# Patient Record
Sex: MALE | Race: WHITE | NOT HISPANIC OR LATINO | URBAN - METROPOLITAN AREA
[De-identification: names, ages, dates, MRNs, and addresses within clinical notes are randomized per-mention and may not be internally consistent; named-entity substitution may affect disease eponyms.]

---

## 2023-11-23 ENCOUNTER — INPATIENT (INPATIENT)
Facility: HOSPITAL | Age: 53
LOS: 8 days | Discharge: NURSING, REHAB OR LONG-TERM CARE | DRG: 896 | End: 2023-12-02
Attending: STUDENT IN AN ORGANIZED HEALTH CARE EDUCATION/TRAINING PROGRAM | Admitting: INTERNAL MEDICINE
Payer: COMMERCIAL

## 2023-11-23 VITALS
TEMPERATURE: 99 F | RESPIRATION RATE: 16 BRPM | SYSTOLIC BLOOD PRESSURE: 145 MMHG | OXYGEN SATURATION: 97 % | HEART RATE: 88 BPM | WEIGHT: 199.96 LBS | DIASTOLIC BLOOD PRESSURE: 79 MMHG

## 2023-11-23 DIAGNOSIS — R56.9 UNSPECIFIED CONVULSIONS: ICD-10-CM

## 2023-11-23 LAB
ALBUMIN SERPL ELPH-MCNC: 3.5 G/DL — SIGNIFICANT CHANGE UP (ref 3.3–5.2)
ALBUMIN SERPL ELPH-MCNC: 3.5 G/DL — SIGNIFICANT CHANGE UP (ref 3.3–5.2)
ALP SERPL-CCNC: 140 U/L — HIGH (ref 40–120)
ALP SERPL-CCNC: 140 U/L — HIGH (ref 40–120)
ALT FLD-CCNC: 113 U/L — HIGH
ALT FLD-CCNC: 113 U/L — HIGH
ANION GAP SERPL CALC-SCNC: 24 MMOL/L — HIGH (ref 5–17)
ANION GAP SERPL CALC-SCNC: 24 MMOL/L — HIGH (ref 5–17)
AST SERPL-CCNC: 344 U/L — HIGH
AST SERPL-CCNC: 344 U/L — HIGH
BASOPHILS # BLD AUTO: 0.02 K/UL — SIGNIFICANT CHANGE UP (ref 0–0.2)
BASOPHILS # BLD AUTO: 0.02 K/UL — SIGNIFICANT CHANGE UP (ref 0–0.2)
BASOPHILS NFR BLD AUTO: 0.4 % — SIGNIFICANT CHANGE UP (ref 0–2)
BASOPHILS NFR BLD AUTO: 0.4 % — SIGNIFICANT CHANGE UP (ref 0–2)
BILIRUB SERPL-MCNC: 2.1 MG/DL — HIGH (ref 0.4–2)
BILIRUB SERPL-MCNC: 2.1 MG/DL — HIGH (ref 0.4–2)
BUN SERPL-MCNC: 6.2 MG/DL — LOW (ref 8–20)
BUN SERPL-MCNC: 6.2 MG/DL — LOW (ref 8–20)
CALCIUM SERPL-MCNC: 8.3 MG/DL — LOW (ref 8.4–10.5)
CALCIUM SERPL-MCNC: 8.3 MG/DL — LOW (ref 8.4–10.5)
CHLORIDE SERPL-SCNC: 79 MMOL/L — LOW (ref 96–108)
CHLORIDE SERPL-SCNC: 79 MMOL/L — LOW (ref 96–108)
CO2 SERPL-SCNC: 21 MMOL/L — LOW (ref 22–29)
CO2 SERPL-SCNC: 21 MMOL/L — LOW (ref 22–29)
CREAT SERPL-MCNC: 1.08 MG/DL — SIGNIFICANT CHANGE UP (ref 0.5–1.3)
CREAT SERPL-MCNC: 1.08 MG/DL — SIGNIFICANT CHANGE UP (ref 0.5–1.3)
EGFR: 82 ML/MIN/1.73M2 — SIGNIFICANT CHANGE UP
EGFR: 82 ML/MIN/1.73M2 — SIGNIFICANT CHANGE UP
EOSINOPHIL # BLD AUTO: 0 K/UL — SIGNIFICANT CHANGE UP (ref 0–0.5)
EOSINOPHIL # BLD AUTO: 0 K/UL — SIGNIFICANT CHANGE UP (ref 0–0.5)
EOSINOPHIL NFR BLD AUTO: 0 % — SIGNIFICANT CHANGE UP (ref 0–6)
EOSINOPHIL NFR BLD AUTO: 0 % — SIGNIFICANT CHANGE UP (ref 0–6)
ETHANOL SERPL-MCNC: <10 MG/DL — SIGNIFICANT CHANGE UP (ref 0–9)
ETHANOL SERPL-MCNC: <10 MG/DL — SIGNIFICANT CHANGE UP (ref 0–9)
GLUCOSE SERPL-MCNC: 110 MG/DL — HIGH (ref 70–99)
GLUCOSE SERPL-MCNC: 110 MG/DL — HIGH (ref 70–99)
HCT VFR BLD CALC: 35.8 % — LOW (ref 39–50)
HCT VFR BLD CALC: 35.8 % — LOW (ref 39–50)
HGB BLD-MCNC: 13.4 G/DL — SIGNIFICANT CHANGE UP (ref 13–17)
HGB BLD-MCNC: 13.4 G/DL — SIGNIFICANT CHANGE UP (ref 13–17)
IMM GRANULOCYTES NFR BLD AUTO: 1.1 % — HIGH (ref 0–0.9)
IMM GRANULOCYTES NFR BLD AUTO: 1.1 % — HIGH (ref 0–0.9)
LYMPHOCYTES # BLD AUTO: 0.29 K/UL — LOW (ref 1–3.3)
LYMPHOCYTES # BLD AUTO: 0.29 K/UL — LOW (ref 1–3.3)
LYMPHOCYTES # BLD AUTO: 5.4 % — LOW (ref 13–44)
LYMPHOCYTES # BLD AUTO: 5.4 % — LOW (ref 13–44)
MAGNESIUM SERPL-MCNC: 2 MG/DL — SIGNIFICANT CHANGE UP (ref 1.6–2.6)
MAGNESIUM SERPL-MCNC: 2 MG/DL — SIGNIFICANT CHANGE UP (ref 1.6–2.6)
MCHC RBC-ENTMCNC: 37.4 GM/DL — HIGH (ref 32–36)
MCHC RBC-ENTMCNC: 37.4 GM/DL — HIGH (ref 32–36)
MCHC RBC-ENTMCNC: 38.5 PG — HIGH (ref 27–34)
MCHC RBC-ENTMCNC: 38.5 PG — HIGH (ref 27–34)
MCV RBC AUTO: 102.9 FL — HIGH (ref 80–100)
MCV RBC AUTO: 102.9 FL — HIGH (ref 80–100)
MONOCYTES # BLD AUTO: 0.48 K/UL — SIGNIFICANT CHANGE UP (ref 0–0.9)
MONOCYTES # BLD AUTO: 0.48 K/UL — SIGNIFICANT CHANGE UP (ref 0–0.9)
MONOCYTES NFR BLD AUTO: 8.9 % — SIGNIFICANT CHANGE UP (ref 2–14)
MONOCYTES NFR BLD AUTO: 8.9 % — SIGNIFICANT CHANGE UP (ref 2–14)
NEUTROPHILS # BLD AUTO: 4.52 K/UL — SIGNIFICANT CHANGE UP (ref 1.8–7.4)
NEUTROPHILS # BLD AUTO: 4.52 K/UL — SIGNIFICANT CHANGE UP (ref 1.8–7.4)
NEUTROPHILS NFR BLD AUTO: 84.2 % — HIGH (ref 43–77)
NEUTROPHILS NFR BLD AUTO: 84.2 % — HIGH (ref 43–77)
NT-PROBNP SERPL-SCNC: 263 PG/ML — SIGNIFICANT CHANGE UP (ref 0–300)
NT-PROBNP SERPL-SCNC: 263 PG/ML — SIGNIFICANT CHANGE UP (ref 0–300)
PHOSPHATE SERPL-MCNC: 2.6 MG/DL — SIGNIFICANT CHANGE UP (ref 2.4–4.7)
PHOSPHATE SERPL-MCNC: 2.6 MG/DL — SIGNIFICANT CHANGE UP (ref 2.4–4.7)
PLATELET # BLD AUTO: 56 K/UL — LOW (ref 150–400)
PLATELET # BLD AUTO: 56 K/UL — LOW (ref 150–400)
POTASSIUM SERPL-MCNC: 3.2 MMOL/L — LOW (ref 3.5–5.3)
POTASSIUM SERPL-MCNC: 3.2 MMOL/L — LOW (ref 3.5–5.3)
POTASSIUM SERPL-SCNC: 3.2 MMOL/L — LOW (ref 3.5–5.3)
POTASSIUM SERPL-SCNC: 3.2 MMOL/L — LOW (ref 3.5–5.3)
PROT SERPL-MCNC: 6 G/DL — LOW (ref 6.6–8.7)
PROT SERPL-MCNC: 6 G/DL — LOW (ref 6.6–8.7)
RBC # BLD: 3.48 M/UL — LOW (ref 4.2–5.8)
RBC # BLD: 3.48 M/UL — LOW (ref 4.2–5.8)
RBC # FLD: 13.2 % — SIGNIFICANT CHANGE UP (ref 10.3–14.5)
RBC # FLD: 13.2 % — SIGNIFICANT CHANGE UP (ref 10.3–14.5)
SODIUM SERPL-SCNC: 124 MMOL/L — LOW (ref 135–145)
SODIUM SERPL-SCNC: 124 MMOL/L — LOW (ref 135–145)
TROPONIN T, HIGH SENSITIVITY RESULT: 14 NG/L — SIGNIFICANT CHANGE UP (ref 0–51)
TROPONIN T, HIGH SENSITIVITY RESULT: 14 NG/L — SIGNIFICANT CHANGE UP (ref 0–51)
WBC # BLD: 5.37 K/UL — SIGNIFICANT CHANGE UP (ref 3.8–10.5)
WBC # BLD: 5.37 K/UL — SIGNIFICANT CHANGE UP (ref 3.8–10.5)
WBC # FLD AUTO: 5.37 K/UL — SIGNIFICANT CHANGE UP (ref 3.8–10.5)
WBC # FLD AUTO: 5.37 K/UL — SIGNIFICANT CHANGE UP (ref 3.8–10.5)

## 2023-11-23 PROCEDURE — 70450 CT HEAD/BRAIN W/O DYE: CPT | Mod: 26,ME

## 2023-11-23 PROCEDURE — 71045 X-RAY EXAM CHEST 1 VIEW: CPT | Mod: 26

## 2023-11-23 PROCEDURE — G1004: CPT

## 2023-11-23 PROCEDURE — 99285 EMERGENCY DEPT VISIT HI MDM: CPT

## 2023-11-23 PROCEDURE — 93010 ELECTROCARDIOGRAM REPORT: CPT

## 2023-11-23 RX ORDER — THIAMINE MONONITRATE (VIT B1) 100 MG
100 TABLET ORAL ONCE
Refills: 0 | Status: COMPLETED | OUTPATIENT
Start: 2023-11-23 | End: 2023-11-23

## 2023-11-23 RX ORDER — PHENOBARBITAL 60 MG
130 TABLET ORAL ONCE
Refills: 0 | Status: DISCONTINUED | OUTPATIENT
Start: 2023-11-23 | End: 2023-11-23

## 2023-11-23 RX ORDER — LEVETIRACETAM 250 MG/1
1000 TABLET, FILM COATED ORAL ONCE
Refills: 0 | Status: COMPLETED | OUTPATIENT
Start: 2023-11-23 | End: 2023-11-23

## 2023-11-23 RX ORDER — SODIUM CHLORIDE 9 MG/ML
1000 INJECTION INTRAMUSCULAR; INTRAVENOUS; SUBCUTANEOUS ONCE
Refills: 0 | Status: COMPLETED | OUTPATIENT
Start: 2023-11-23 | End: 2023-11-23

## 2023-11-23 RX ORDER — FOLIC ACID 0.8 MG
1 TABLET ORAL ONCE
Refills: 0 | Status: COMPLETED | OUTPATIENT
Start: 2023-11-23 | End: 2023-11-23

## 2023-11-23 RX ORDER — POTASSIUM CHLORIDE 20 MEQ
40 PACKET (EA) ORAL ONCE
Refills: 0 | Status: COMPLETED | OUTPATIENT
Start: 2023-11-23 | End: 2023-11-23

## 2023-11-23 RX ADMIN — SODIUM CHLORIDE 1000 MILLILITER(S): 9 INJECTION INTRAMUSCULAR; INTRAVENOUS; SUBCUTANEOUS at 20:50

## 2023-11-23 RX ADMIN — Medication 40 MILLIEQUIVALENT(S): at 20:49

## 2023-11-23 RX ADMIN — Medication 130 MILLIGRAM(S): at 20:50

## 2023-11-23 RX ADMIN — LEVETIRACETAM 400 MILLIGRAM(S): 250 TABLET, FILM COATED ORAL at 18:57

## 2023-11-23 RX ADMIN — Medication 1 MILLIGRAM(S): at 20:50

## 2023-11-23 RX ADMIN — Medication 2 MILLIGRAM(S): at 18:53

## 2023-11-23 RX ADMIN — Medication 100 MILLIGRAM(S): at 20:50

## 2023-11-23 RX ADMIN — Medication 1 TABLET(S): at 20:50

## 2023-11-23 RX ADMIN — SODIUM CHLORIDE 1000 MILLILITER(S): 9 INJECTION INTRAMUSCULAR; INTRAVENOUS; SUBCUTANEOUS at 18:54

## 2023-11-23 RX ADMIN — Medication 2 MILLIGRAM(S): at 19:53

## 2023-11-23 NOTE — ED PROVIDER NOTE - OBJECTIVE STATEMENT
Patient is a 54yo M with no significant PMHx who presents to the ED complaining of seizures. Per triage note, multiple witnessed seizures and A&Ox2. Patient now A&Ox3, has no complaints. Reports he had a seizure once many years ago, doesn't remember any details, but was not started on any medications and never saw a neurologist. Denies recent illnesses, fever, chills, nausea, vomiting, headache, dizziness, nausea, vomiting, chest pain, palpitations. Patient endorses smoking a pack a day, drinking 2-3 drinks about 4 times a week, and smoking marijuana, denies any other drug use. Last drink was two days ago. Denies any history of alcohol withdrawal, denies anxiety, tremulousness, diaphoresis.

## 2023-11-23 NOTE — ED ADULT TRIAGE NOTE - CHIEF COMPLAINT QUOTE
Patient BIBEMS s/p multiple witnessed seizures at home. Patient is A&Ox2 at this time c/o weakness. Patient with no known hx of seizures.

## 2023-11-23 NOTE — ED PROVIDER NOTE - ATTENDING CONTRIBUTION TO CARE
Pertinent PMH/PSH/FHx/SHx and Review of Systems contained within:  Patient presents to the ED for reported seizure.  States no PMH.  Does state daily EtOH 3-4x/day.  had seizure about 3 years ago and was hospitalized for 2 weeks at that time but cannot recall any of the hospital stay.  Patient tremulous in ED. no tongue biting or loss of bowel/bladder continence.  Otherwise baseline.  Non toxic.  Well appearing. No aggravating or relieving factors. No other pertinent PMH.   No fever/chills, No photophobia/eye pain/changes in vision, No chest pain/palpitations, no SOB/cough/wheeze/stridor, No abdominal pain, No N/V/D, No neck/back pain, no rash, no changes in neurological status/function.     Gen: Alert, NAD  Head: NC, AT, PERRL, EOMI, normal lids/conjunctiva  ENT: normal hearing, patent oropharynx   Neck: +supple, no tenderness/meningismus/JVD, +Trachea midline  Pulm: Bilateral BS, normal resp effort, no wheeze/stridor/retractions  CV: RRR, no R/G, +dist pulses  Abd: soft, NT/ND, +BS, no hepatosplenomegaly  Mskel: no edema/erythema/cyanosis  Skin: no rash  Neuro: AAOx3, no gross sensory/motor deficits Pertinent PMH/PSH/FHx/SHx and Review of Systems contained within:  Patient presents to the ED for reported seizure (possibly 2?).  States no PMH.  Does state daily EtOH 3-4x/day.  had seizure about 3 years ago and was hospitalized for 2 weeks at that time but cannot recall any of the hospital stay.  Patient tremulous in ED. no tongue biting or loss of bowel/bladder continence.  Otherwise baseline.  Non toxic.  Well appearing. No aggravating or relieving factors. No other pertinent PMH.   No fever/chills, No photophobia/eye pain/changes in vision, No chest pain/palpitations, no SOB/cough/wheeze/stridor, No abdominal pain, No N/V/D, No neck/back pain, no rash, no changes in neurological status/function.     Gen: Alert, NAD  Head: NC, AT, PERRL, EOMI, normal lids/conjunctiva  ENT: normal hearing, patent oropharynx   Neck: +supple, no tenderness/meningismus/JVD, +Trachea midline  Pulm: Bilateral BS, normal resp effort, no wheeze/stridor/retractions  CV: RRR, no R/G, +dist pulses  Abd: soft, NT/ND, +BS, no hepatosplenomegaly  Mskel: no edema/erythema/cyanosis  Skin: no rash  Neuro: AAOx3, tremulous, no gross sensory/motor deficits    Patient with multiple seizures PTA.  Signficant other arrived and stated long standing history of EtOH use disorder with prior withdrawal seizures requiring hospitalization in the past.  Lab values c/w EtOH ketoacidosis.  electrolytes repleated.  phos pending.  Repeat chemistry pending.  CT head pending.  Patient signed out to incoming physician.  All decisions regarding the progression of care will be made at their discretion.

## 2023-11-23 NOTE — ED ADULT NURSE NOTE - NSFALLUNIVINTERV_ED_ALL_ED
Bed/Stretcher in lowest position, wheels locked, appropriate side rails in place/Call bell, personal items and telephone in reach/Instruct patient to call for assistance before getting out of bed/chair/stretcher/Non-slip footwear applied when patient is off stretcher/[place] to call system/Physically safe environment - no spills, clutter or unnecessary equipment/Purposeful proactive rounding/Room/bathroom lighting operational, light cord in reach

## 2023-11-23 NOTE — ED ADULT NURSE REASSESSMENT NOTE - NS ED NURSE REASSESS COMMENT FT1
Pt breathing even and unlabored at this time, no acute distress noted. Pt resting comfortably, awaiting CTr. Has no complaints at this time. cardiac monitor maintained.

## 2023-11-23 NOTE — ED ADULT NURSE REASSESSMENT NOTE - NS ED NURSE REASSESS COMMENT FT1
assumed care of pt @ 1915 from previous RN Bandar Romero. Pt breathing even and unlabored at this time, no acute distress noted. Pt resting comfortably, awaiting CT. Pt able to make needs known, has no complaints at this time.

## 2023-11-23 NOTE — ED PROVIDER NOTE - PHYSICAL EXAMINATION
Gen: AAOx3, NAD, well nourished  HEENT: Normocephalic atraumatic. EOMI. No scleral icterus. Moist mucus membranes.  CV: RRR. Audible S1 and S2. No murmurs. 2+ radial and PT pulses   Pulm: Clear to auscultation bilaterally. No wheezes, rales, or rhonchi. No accessory muscle use or respiratory distress.  Abdomen: soft, normoactive BS, non distended, nontender, no rebound, no guarding  Musculoskeletal:  Moving all extremities equally. No gross deformity. No tenderness to palpation.  Skin: No rashes or lesions. Warm.  Neurologic: No focal neurological deficits. CN II-XII grossly intact.  Psych: Appropriate mood and affect. Cooperative.

## 2023-11-24 DIAGNOSIS — Z72.0 TOBACCO USE: ICD-10-CM

## 2023-11-24 DIAGNOSIS — F10.10 ALCOHOL ABUSE, UNCOMPLICATED: ICD-10-CM

## 2023-11-24 DIAGNOSIS — F12.10 CANNABIS ABUSE, UNCOMPLICATED: ICD-10-CM

## 2023-11-24 LAB
ALBUMIN SERPL ELPH-MCNC: 3.1 G/DL — LOW (ref 3.3–5.2)
ALBUMIN SERPL ELPH-MCNC: 3.2 G/DL — LOW (ref 3.3–5.2)
ALBUMIN SERPL ELPH-MCNC: 3.4 G/DL — SIGNIFICANT CHANGE UP (ref 3.3–5.2)
ALBUMIN SERPL ELPH-MCNC: 3.4 G/DL — SIGNIFICANT CHANGE UP (ref 3.3–5.2)
ALP SERPL-CCNC: 119 U/L — SIGNIFICANT CHANGE UP (ref 40–120)
ALP SERPL-CCNC: 123 U/L — HIGH (ref 40–120)
ALP SERPL-CCNC: 123 U/L — HIGH (ref 40–120)
ALP SERPL-CCNC: 127 U/L — HIGH (ref 40–120)
ALP SERPL-CCNC: 127 U/L — HIGH (ref 40–120)
ALP SERPL-CCNC: 128 U/L — HIGH (ref 40–120)
ALP SERPL-CCNC: 128 U/L — HIGH (ref 40–120)
ALT FLD-CCNC: 103 U/L — HIGH
ALT FLD-CCNC: 105 U/L — HIGH
ALT FLD-CCNC: 105 U/L — HIGH
ALT FLD-CCNC: 91 U/L — HIGH
ALT FLD-CCNC: 91 U/L — HIGH
ALT FLD-CCNC: 97 U/L — HIGH
ALT FLD-CCNC: 97 U/L — HIGH
ANION GAP SERPL CALC-SCNC: 13 MMOL/L — SIGNIFICANT CHANGE UP (ref 5–17)
ANION GAP SERPL CALC-SCNC: 13 MMOL/L — SIGNIFICANT CHANGE UP (ref 5–17)
ANION GAP SERPL CALC-SCNC: 14 MMOL/L — SIGNIFICANT CHANGE UP (ref 5–17)
ANION GAP SERPL CALC-SCNC: 16 MMOL/L — SIGNIFICANT CHANGE UP (ref 5–17)
ANION GAP SERPL CALC-SCNC: 19 MMOL/L — HIGH (ref 5–17)
ANION GAP SERPL CALC-SCNC: 19 MMOL/L — HIGH (ref 5–17)
APAP SERPL-MCNC: <3 UG/ML — LOW (ref 10–26)
APAP SERPL-MCNC: <3 UG/ML — LOW (ref 10–26)
APTT BLD: 31 SEC — SIGNIFICANT CHANGE UP (ref 24.5–35.6)
APTT BLD: 31 SEC — SIGNIFICANT CHANGE UP (ref 24.5–35.6)
AST SERPL-CCNC: 228 U/L — HIGH
AST SERPL-CCNC: 228 U/L — HIGH
AST SERPL-CCNC: 270 U/L — HIGH
AST SERPL-CCNC: 270 U/L — HIGH
AST SERPL-CCNC: 295 U/L — HIGH
AST SERPL-CCNC: 295 U/L — HIGH
AST SERPL-CCNC: 335 U/L — HIGH
AST SERPL-CCNC: 335 U/L — HIGH
AST SERPL-CCNC: 341 U/L — HIGH
AST SERPL-CCNC: 341 U/L — HIGH
B-OH-BUTYR SERPL-SCNC: 0.2 MMOL/L — SIGNIFICANT CHANGE UP
B-OH-BUTYR SERPL-SCNC: 0.2 MMOL/L — SIGNIFICANT CHANGE UP
BILIRUB DIRECT SERPL-MCNC: 1.5 MG/DL — HIGH (ref 0–0.3)
BILIRUB DIRECT SERPL-MCNC: 1.5 MG/DL — HIGH (ref 0–0.3)
BILIRUB INDIRECT FLD-MCNC: 0.9 MG/DL — SIGNIFICANT CHANGE UP (ref 0.2–1)
BILIRUB INDIRECT FLD-MCNC: 0.9 MG/DL — SIGNIFICANT CHANGE UP (ref 0.2–1)
BILIRUB SERPL-MCNC: 2 MG/DL — SIGNIFICANT CHANGE UP (ref 0.4–2)
BILIRUB SERPL-MCNC: 2 MG/DL — SIGNIFICANT CHANGE UP (ref 0.4–2)
BILIRUB SERPL-MCNC: 2.2 MG/DL — HIGH (ref 0.4–2)
BILIRUB SERPL-MCNC: 2.4 MG/DL — HIGH (ref 0.4–2)
BILIRUB SERPL-MCNC: 2.4 MG/DL — HIGH (ref 0.4–2)
BILIRUB SERPL-MCNC: 2.5 MG/DL — HIGH (ref 0.4–2)
BILIRUB SERPL-MCNC: 2.5 MG/DL — HIGH (ref 0.4–2)
BUN SERPL-MCNC: 5.4 MG/DL — LOW (ref 8–20)
BUN SERPL-MCNC: 5.5 MG/DL — LOW (ref 8–20)
BUN SERPL-MCNC: 5.5 MG/DL — LOW (ref 8–20)
BUN SERPL-MCNC: 5.6 MG/DL — LOW (ref 8–20)
BUN SERPL-MCNC: 5.6 MG/DL — LOW (ref 8–20)
BUN SERPL-MCNC: 5.7 MG/DL — LOW (ref 8–20)
BUN SERPL-MCNC: 5.7 MG/DL — LOW (ref 8–20)
BUN SERPL-MCNC: 5.8 MG/DL — LOW (ref 8–20)
BUN SERPL-MCNC: 5.8 MG/DL — LOW (ref 8–20)
CALCIUM SERPL-MCNC: 7.5 MG/DL — LOW (ref 8.4–10.5)
CALCIUM SERPL-MCNC: 7.5 MG/DL — LOW (ref 8.4–10.5)
CALCIUM SERPL-MCNC: 7.6 MG/DL — LOW (ref 8.4–10.5)
CALCIUM SERPL-MCNC: 7.6 MG/DL — LOW (ref 8.4–10.5)
CALCIUM SERPL-MCNC: 7.8 MG/DL — LOW (ref 8.4–10.5)
CALCIUM SERPL-MCNC: 7.9 MG/DL — LOW (ref 8.4–10.5)
CALCIUM SERPL-MCNC: 7.9 MG/DL — LOW (ref 8.4–10.5)
CALCIUM SERPL-MCNC: 8 MG/DL — LOW (ref 8.4–10.5)
CALCIUM SERPL-MCNC: 8 MG/DL — LOW (ref 8.4–10.5)
CHLORIDE SERPL-SCNC: 87 MMOL/L — LOW (ref 96–108)
CHLORIDE SERPL-SCNC: 90 MMOL/L — LOW (ref 96–108)
CHLORIDE SERPL-SCNC: 90 MMOL/L — LOW (ref 96–108)
CHLORIDE SERPL-SCNC: 91 MMOL/L — LOW (ref 96–108)
CHLORIDE SERPL-SCNC: 91 MMOL/L — LOW (ref 96–108)
CHLORIDE SERPL-SCNC: 92 MMOL/L — LOW (ref 96–108)
CHLORIDE SERPL-SCNC: 92 MMOL/L — LOW (ref 96–108)
CHLORIDE SERPL-SCNC: 93 MMOL/L — LOW (ref 96–108)
CHLORIDE SERPL-SCNC: 93 MMOL/L — LOW (ref 96–108)
CO2 SERPL-SCNC: 22 MMOL/L — SIGNIFICANT CHANGE UP (ref 22–29)
CO2 SERPL-SCNC: 22 MMOL/L — SIGNIFICANT CHANGE UP (ref 22–29)
CO2 SERPL-SCNC: 24 MMOL/L — SIGNIFICANT CHANGE UP (ref 22–29)
CO2 SERPL-SCNC: 25 MMOL/L — SIGNIFICANT CHANGE UP (ref 22–29)
CO2 SERPL-SCNC: 25 MMOL/L — SIGNIFICANT CHANGE UP (ref 22–29)
CO2 SERPL-SCNC: 26 MMOL/L — SIGNIFICANT CHANGE UP (ref 22–29)
CO2 SERPL-SCNC: 26 MMOL/L — SIGNIFICANT CHANGE UP (ref 22–29)
CO2 SERPL-SCNC: 27 MMOL/L — SIGNIFICANT CHANGE UP (ref 22–29)
CO2 SERPL-SCNC: 27 MMOL/L — SIGNIFICANT CHANGE UP (ref 22–29)
CREAT SERPL-MCNC: 0.7 MG/DL — SIGNIFICANT CHANGE UP (ref 0.5–1.3)
CREAT SERPL-MCNC: 0.7 MG/DL — SIGNIFICANT CHANGE UP (ref 0.5–1.3)
CREAT SERPL-MCNC: 0.72 MG/DL — SIGNIFICANT CHANGE UP (ref 0.5–1.3)
CREAT SERPL-MCNC: 0.72 MG/DL — SIGNIFICANT CHANGE UP (ref 0.5–1.3)
CREAT SERPL-MCNC: 0.75 MG/DL — SIGNIFICANT CHANGE UP (ref 0.5–1.3)
CREAT SERPL-MCNC: 0.75 MG/DL — SIGNIFICANT CHANGE UP (ref 0.5–1.3)
CREAT SERPL-MCNC: 0.77 MG/DL — SIGNIFICANT CHANGE UP (ref 0.5–1.3)
CREAT SERPL-MCNC: 0.77 MG/DL — SIGNIFICANT CHANGE UP (ref 0.5–1.3)
CREAT SERPL-MCNC: 0.79 MG/DL — SIGNIFICANT CHANGE UP (ref 0.5–1.3)
CREAT SERPL-MCNC: 0.79 MG/DL — SIGNIFICANT CHANGE UP (ref 0.5–1.3)
CREAT SERPL-MCNC: 0.91 MG/DL — SIGNIFICANT CHANGE UP (ref 0.5–1.3)
CREAT SERPL-MCNC: 0.91 MG/DL — SIGNIFICANT CHANGE UP (ref 0.5–1.3)
EGFR: 101 ML/MIN/1.73M2 — SIGNIFICANT CHANGE UP
EGFR: 101 ML/MIN/1.73M2 — SIGNIFICANT CHANGE UP
EGFR: 106 ML/MIN/1.73M2 — SIGNIFICANT CHANGE UP
EGFR: 106 ML/MIN/1.73M2 — SIGNIFICANT CHANGE UP
EGFR: 107 ML/MIN/1.73M2 — SIGNIFICANT CHANGE UP
EGFR: 107 ML/MIN/1.73M2 — SIGNIFICANT CHANGE UP
EGFR: 108 ML/MIN/1.73M2 — SIGNIFICANT CHANGE UP
EGFR: 108 ML/MIN/1.73M2 — SIGNIFICANT CHANGE UP
EGFR: 109 ML/MIN/1.73M2 — SIGNIFICANT CHANGE UP
EGFR: 109 ML/MIN/1.73M2 — SIGNIFICANT CHANGE UP
EGFR: 110 ML/MIN/1.73M2 — SIGNIFICANT CHANGE UP
EGFR: 110 ML/MIN/1.73M2 — SIGNIFICANT CHANGE UP
GLUCOSE SERPL-MCNC: 63 MG/DL — LOW (ref 70–99)
GLUCOSE SERPL-MCNC: 63 MG/DL — LOW (ref 70–99)
GLUCOSE SERPL-MCNC: 65 MG/DL — LOW (ref 70–99)
GLUCOSE SERPL-MCNC: 65 MG/DL — LOW (ref 70–99)
GLUCOSE SERPL-MCNC: 72 MG/DL — SIGNIFICANT CHANGE UP (ref 70–99)
GLUCOSE SERPL-MCNC: 72 MG/DL — SIGNIFICANT CHANGE UP (ref 70–99)
GLUCOSE SERPL-MCNC: 73 MG/DL — SIGNIFICANT CHANGE UP (ref 70–99)
GLUCOSE SERPL-MCNC: 88 MG/DL — SIGNIFICANT CHANGE UP (ref 70–99)
GLUCOSE SERPL-MCNC: 88 MG/DL — SIGNIFICANT CHANGE UP (ref 70–99)
HCT VFR BLD CALC: 35 % — LOW (ref 39–50)
HCT VFR BLD CALC: 35 % — LOW (ref 39–50)
HCT VFR BLD CALC: 37.9 % — LOW (ref 39–50)
HCT VFR BLD CALC: 37.9 % — LOW (ref 39–50)
HGB BLD-MCNC: 12.9 G/DL — LOW (ref 13–17)
HGB BLD-MCNC: 12.9 G/DL — LOW (ref 13–17)
HGB BLD-MCNC: 13.9 G/DL — SIGNIFICANT CHANGE UP (ref 13–17)
HGB BLD-MCNC: 13.9 G/DL — SIGNIFICANT CHANGE UP (ref 13–17)
INR BLD: 1.16 RATIO — SIGNIFICANT CHANGE UP (ref 0.85–1.18)
INR BLD: 1.16 RATIO — SIGNIFICANT CHANGE UP (ref 0.85–1.18)
LACTATE SERPL-SCNC: 1.3 MMOL/L — SIGNIFICANT CHANGE UP (ref 0.5–2)
LACTATE SERPL-SCNC: 1.3 MMOL/L — SIGNIFICANT CHANGE UP (ref 0.5–2)
MAGNESIUM SERPL-MCNC: 1.4 MG/DL — LOW (ref 1.6–2.6)
MAGNESIUM SERPL-MCNC: 1.4 MG/DL — LOW (ref 1.6–2.6)
MAGNESIUM SERPL-MCNC: 1.7 MG/DL — SIGNIFICANT CHANGE UP (ref 1.6–2.6)
MAGNESIUM SERPL-MCNC: 1.7 MG/DL — SIGNIFICANT CHANGE UP (ref 1.6–2.6)
MCHC RBC-ENTMCNC: 36.7 GM/DL — HIGH (ref 32–36)
MCHC RBC-ENTMCNC: 36.7 GM/DL — HIGH (ref 32–36)
MCHC RBC-ENTMCNC: 36.9 GM/DL — HIGH (ref 32–36)
MCHC RBC-ENTMCNC: 36.9 GM/DL — HIGH (ref 32–36)
MCHC RBC-ENTMCNC: 38.2 PG — HIGH (ref 27–34)
MCV RBC AUTO: 103.6 FL — HIGH (ref 80–100)
MCV RBC AUTO: 103.6 FL — HIGH (ref 80–100)
MCV RBC AUTO: 104.1 FL — HIGH (ref 80–100)
MCV RBC AUTO: 104.1 FL — HIGH (ref 80–100)
OSMOLALITY SERPL: 261 MOSMOL/KG — LOW (ref 275–300)
OSMOLALITY SERPL: 261 MOSMOL/KG — LOW (ref 275–300)
PHOSPHATE SERPL-MCNC: 2.2 MG/DL — LOW (ref 2.4–4.7)
PHOSPHATE SERPL-MCNC: 2.2 MG/DL — LOW (ref 2.4–4.7)
PLATELET # BLD AUTO: 59 K/UL — LOW (ref 150–400)
PLATELET # BLD AUTO: 59 K/UL — LOW (ref 150–400)
PLATELET # BLD AUTO: 62 K/UL — LOW (ref 150–400)
PLATELET # BLD AUTO: 62 K/UL — LOW (ref 150–400)
POTASSIUM SERPL-MCNC: 3.2 MMOL/L — LOW (ref 3.5–5.3)
POTASSIUM SERPL-MCNC: 3.2 MMOL/L — LOW (ref 3.5–5.3)
POTASSIUM SERPL-MCNC: 3.3 MMOL/L — LOW (ref 3.5–5.3)
POTASSIUM SERPL-MCNC: 3.3 MMOL/L — LOW (ref 3.5–5.3)
POTASSIUM SERPL-MCNC: 3.5 MMOL/L — SIGNIFICANT CHANGE UP (ref 3.5–5.3)
POTASSIUM SERPL-MCNC: 3.8 MMOL/L — SIGNIFICANT CHANGE UP (ref 3.5–5.3)
POTASSIUM SERPL-MCNC: 3.8 MMOL/L — SIGNIFICANT CHANGE UP (ref 3.5–5.3)
POTASSIUM SERPL-SCNC: 3.2 MMOL/L — LOW (ref 3.5–5.3)
POTASSIUM SERPL-SCNC: 3.2 MMOL/L — LOW (ref 3.5–5.3)
POTASSIUM SERPL-SCNC: 3.3 MMOL/L — LOW (ref 3.5–5.3)
POTASSIUM SERPL-SCNC: 3.3 MMOL/L — LOW (ref 3.5–5.3)
POTASSIUM SERPL-SCNC: 3.5 MMOL/L — SIGNIFICANT CHANGE UP (ref 3.5–5.3)
POTASSIUM SERPL-SCNC: 3.8 MMOL/L — SIGNIFICANT CHANGE UP (ref 3.5–5.3)
POTASSIUM SERPL-SCNC: 3.8 MMOL/L — SIGNIFICANT CHANGE UP (ref 3.5–5.3)
PROLACTIN SERPL-MCNC: 13.4 NG/ML — SIGNIFICANT CHANGE UP (ref 4.1–18.4)
PROLACTIN SERPL-MCNC: 13.4 NG/ML — SIGNIFICANT CHANGE UP (ref 4.1–18.4)
PROT SERPL-MCNC: 5.1 G/DL — LOW (ref 6.6–8.7)
PROT SERPL-MCNC: 5.1 G/DL — LOW (ref 6.6–8.7)
PROT SERPL-MCNC: 5.2 G/DL — LOW (ref 6.6–8.7)
PROT SERPL-MCNC: 5.2 G/DL — LOW (ref 6.6–8.7)
PROT SERPL-MCNC: 5.4 G/DL — LOW (ref 6.6–8.7)
PROT SERPL-MCNC: 5.8 G/DL — LOW (ref 6.6–8.7)
PROT SERPL-MCNC: 5.8 G/DL — LOW (ref 6.6–8.7)
PROTHROM AB SERPL-ACNC: 12.8 SEC — SIGNIFICANT CHANGE UP (ref 9.5–13)
PROTHROM AB SERPL-ACNC: 12.8 SEC — SIGNIFICANT CHANGE UP (ref 9.5–13)
RBC # BLD: 3.38 M/UL — LOW (ref 4.2–5.8)
RBC # BLD: 3.38 M/UL — LOW (ref 4.2–5.8)
RBC # BLD: 3.64 M/UL — LOW (ref 4.2–5.8)
RBC # BLD: 3.64 M/UL — LOW (ref 4.2–5.8)
RBC # FLD: 13.6 % — SIGNIFICANT CHANGE UP (ref 10.3–14.5)
RBC # FLD: 13.6 % — SIGNIFICANT CHANGE UP (ref 10.3–14.5)
RBC # FLD: 13.7 % — SIGNIFICANT CHANGE UP (ref 10.3–14.5)
RBC # FLD: 13.7 % — SIGNIFICANT CHANGE UP (ref 10.3–14.5)
SALICYLATES SERPL-MCNC: <0.6 MG/DL — LOW (ref 10–20)
SALICYLATES SERPL-MCNC: <0.6 MG/DL — LOW (ref 10–20)
SODIUM SERPL-SCNC: 127 MMOL/L — LOW (ref 135–145)
SODIUM SERPL-SCNC: 130 MMOL/L — LOW (ref 135–145)
SODIUM SERPL-SCNC: 131 MMOL/L — LOW (ref 135–145)
SODIUM SERPL-SCNC: 131 MMOL/L — LOW (ref 135–145)
SODIUM SERPL-SCNC: 132 MMOL/L — LOW (ref 135–145)
SODIUM SERPL-SCNC: 132 MMOL/L — LOW (ref 135–145)
WBC # BLD: 3.93 K/UL — SIGNIFICANT CHANGE UP (ref 3.8–10.5)
WBC # BLD: 3.93 K/UL — SIGNIFICANT CHANGE UP (ref 3.8–10.5)
WBC # BLD: 5.99 K/UL — SIGNIFICANT CHANGE UP (ref 3.8–10.5)
WBC # BLD: 5.99 K/UL — SIGNIFICANT CHANGE UP (ref 3.8–10.5)
WBC # FLD AUTO: 3.93 K/UL — SIGNIFICANT CHANGE UP (ref 3.8–10.5)
WBC # FLD AUTO: 3.93 K/UL — SIGNIFICANT CHANGE UP (ref 3.8–10.5)
WBC # FLD AUTO: 5.99 K/UL — SIGNIFICANT CHANGE UP (ref 3.8–10.5)
WBC # FLD AUTO: 5.99 K/UL — SIGNIFICANT CHANGE UP (ref 3.8–10.5)

## 2023-11-24 PROCEDURE — 93010 ELECTROCARDIOGRAM REPORT: CPT

## 2023-11-24 PROCEDURE — 99223 1ST HOSP IP/OBS HIGH 75: CPT

## 2023-11-24 RX ORDER — POTASSIUM CHLORIDE 20 MEQ
20 PACKET (EA) ORAL
Refills: 0 | Status: COMPLETED | OUTPATIENT
Start: 2023-11-24 | End: 2023-11-24

## 2023-11-24 RX ORDER — THIAMINE MONONITRATE (VIT B1) 100 MG
100 TABLET ORAL DAILY
Refills: 0 | Status: DISCONTINUED | OUTPATIENT
Start: 2023-11-24 | End: 2023-11-25

## 2023-11-24 RX ORDER — FOLIC ACID 0.8 MG
1 TABLET ORAL DAILY
Refills: 0 | Status: DISCONTINUED | OUTPATIENT
Start: 2023-11-24 | End: 2023-12-02

## 2023-11-24 RX ORDER — SODIUM CHLORIDE 9 MG/ML
1000 INJECTION INTRAMUSCULAR; INTRAVENOUS; SUBCUTANEOUS
Refills: 0 | Status: DISCONTINUED | OUTPATIENT
Start: 2023-11-24 | End: 2023-11-26

## 2023-11-24 RX ORDER — NICOTINE POLACRILEX 2 MG
1 GUM BUCCAL DAILY
Refills: 0 | Status: DISCONTINUED | OUTPATIENT
Start: 2023-11-24 | End: 2023-12-02

## 2023-11-24 RX ORDER — SODIUM CHLORIDE 9 MG/ML
1000 INJECTION INTRAMUSCULAR; INTRAVENOUS; SUBCUTANEOUS
Refills: 0 | Status: DISCONTINUED | OUTPATIENT
Start: 2023-11-24 | End: 2023-11-24

## 2023-11-24 RX ORDER — POTASSIUM PHOSPHATE, MONOBASIC POTASSIUM PHOSPHATE, DIBASIC 236; 224 MG/ML; MG/ML
30 INJECTION, SOLUTION INTRAVENOUS ONCE
Refills: 0 | Status: COMPLETED | OUTPATIENT
Start: 2023-11-24 | End: 2023-11-24

## 2023-11-24 RX ORDER — ONDANSETRON 8 MG/1
4 TABLET, FILM COATED ORAL EVERY 8 HOURS
Refills: 0 | Status: DISCONTINUED | OUTPATIENT
Start: 2023-11-24 | End: 2023-12-02

## 2023-11-24 RX ADMIN — Medication 1 MILLIGRAM(S): at 10:27

## 2023-11-24 RX ADMIN — Medication 1 TABLET(S): at 10:27

## 2023-11-24 RX ADMIN — Medication 1 PATCH: at 10:28

## 2023-11-24 RX ADMIN — Medication 2 MILLIGRAM(S): at 03:18

## 2023-11-24 RX ADMIN — Medication 2 MILLIGRAM(S): at 13:16

## 2023-11-24 RX ADMIN — Medication 1 MILLIGRAM(S): at 21:50

## 2023-11-24 RX ADMIN — Medication 2 MILLIGRAM(S): at 15:25

## 2023-11-24 RX ADMIN — Medication 2 MILLIGRAM(S): at 10:22

## 2023-11-24 RX ADMIN — Medication 2 MILLIGRAM(S): at 11:45

## 2023-11-24 RX ADMIN — SODIUM CHLORIDE 75 MILLILITER(S): 9 INJECTION INTRAMUSCULAR; INTRAVENOUS; SUBCUTANEOUS at 22:57

## 2023-11-24 RX ADMIN — SODIUM CHLORIDE 75 MILLILITER(S): 9 INJECTION INTRAMUSCULAR; INTRAVENOUS; SUBCUTANEOUS at 15:25

## 2023-11-24 RX ADMIN — Medication 2 MILLIGRAM(S): at 06:35

## 2023-11-24 RX ADMIN — POTASSIUM PHOSPHATE, MONOBASIC POTASSIUM PHOSPHATE, DIBASIC 83.33 MILLIMOLE(S): 236; 224 INJECTION, SOLUTION INTRAVENOUS at 10:22

## 2023-11-24 RX ADMIN — Medication 2 MILLIGRAM(S): at 21:08

## 2023-11-24 RX ADMIN — SODIUM CHLORIDE 100 MILLILITER(S): 9 INJECTION INTRAMUSCULAR; INTRAVENOUS; SUBCUTANEOUS at 10:30

## 2023-11-24 RX ADMIN — SODIUM CHLORIDE 100 MILLILITER(S): 9 INJECTION INTRAMUSCULAR; INTRAVENOUS; SUBCUTANEOUS at 06:35

## 2023-11-24 RX ADMIN — Medication 20 MILLIEQUIVALENT(S): at 14:22

## 2023-11-24 RX ADMIN — Medication 100 MILLIGRAM(S): at 10:26

## 2023-11-24 RX ADMIN — Medication 2 MILLIGRAM(S): at 18:26

## 2023-11-24 RX ADMIN — Medication 20 MILLIEQUIVALENT(S): at 10:25

## 2023-11-24 NOTE — H&P ADULT - ASSESSMENT
ASSESSMENT:  ***    PLAN:  1.Witnessed seizures + multiple electrolyte abnormalities w/ hx of alcohol abuse  -will monitor on stepdown  -afebrile, no leukocytosis  -EKG as above  -CXRAY as above  -S/P in ED  -S/P L NS in ED, will continue  -LFTs within normal limits  -sodium 124  -will f/u serum osmolarity, urine osmolarity, urine sodium and urine creatinine  -potassium 3.2  -CO2 21  -AG 24  -Alk phos 140  -  -  -will f/u beta-hydroxybutyrate  -initial troponin 14 at 18:45  -magnesium 2  -phos 2.6  -proBNP 263  -alcohol <10  -CT head w/o contrast as above  -S/P 2L NS in ER  -S/P folic acid 1mg po X 1, MVI 1 tab po X 1, thiamine 100mg po X 1 in ER  -S/P keppra 1g IV X 1, ativan 2mg X 2, [id number] IV X 1, and potassium 40mEQ po X 1 in ER  -will f/u U/A  -will f/u official report of CXRAY    2.Thrombocytopenia   -platelet count 56   -will monitor for now      DVT PPx ASSESSMENT:  This is a 53 year old male w/ PMHx of polysubstance abuse - alcohol, tobacco and marijuana presents for evaluation of multiple witnessed seizures by family as he was sitting chatting.     PLAN:  1.Witnessed seizures + multiple electrolyte abnormalities w/ hx of alcohol abuse  -will monitor on stepdown  -afebrile, no leukocytosis  -EKG as above  -will official report of CXRAY   -sodium 124  -will f/u serum osmolarity, urine osmolarity, urine sodium and urine creatinine  -potassium 3.2  S/P potassium 40mEQ po X 1 in ER, will f/u repeat K in AM  -CO2 21  -AG 24, will f/u repeat BMP  -will f/u salicylate level  -will f/u tylenol level  -will f/u lactic acid level  -Alk phos 140  -  -  -will f/u beta-hydroxybutyrate  -initial troponin 14 at 18:45  -magnesium 2  -phos 2.6  -proBNP 263  -alcohol <10  -CT head w/o contrast as above  -S/P 2L NS in ER, will continue  -S/P folic acid 1mg po X 1, MVI 1 tab po X 1, thiamine 100mg po X 1 in ER, will continue folic acid 1mg QD, MVI 1 tab QD and thiamine 100mg QD  -S/P keppra 1g IV X 1, ativan 2mg X 2, [id number] IV X 1 in ER  -will place on CIWA  -will f/u U/A  -will f/u official report of CXRAY  -will place on fall precautions  -will place on seizure precautions  -would consider neurology consult in AM    2.Thrombocytopenia   -platelet count 56   -will monitor for now    3.DVT PPx  -will place B/L LE SCDs ASSESSMENT:  This is a 53 year old male w/ PMHx of polysubstance abuse - alcohol, tobacco and marijuana presents for evaluation of multiple witnessed seizures by family as he was sitting chatting.     PLAN:  1.Witnessed seizures + multiple electrolyte abnormalities w/ hx of alcohol abuse  -will monitor on stepdown  -afebrile, no leukocytosis  -EKG as above  -will official report of CXRAY   -sodium 124  -will f/u serum osmolarity, urine osmolarity, urine sodium and urine creatinine  -potassium 3.2  S/P potassium 40mEQ po X 1 in ER, will f/u repeat K in AM  -CO2 21  -AG 24, will f/u repeat BMP  -will f/u salicylate level  -will f/u tylenol level  -will f/u lactic acid level  -Alk phos 140  -  -  -will f/u beta-hydroxybutyrate  -initial troponin 14 at 18:45  -magnesium 2  -phos 2.6  -proBNP 263  -alcohol <10  -CT head w/o contrast as above  -S/P 2L NS in ER, will continue  -S/P folic acid 1mg po X 1, MVI 1 tab po X 1, thiamine 100mg po X 1 in ER, will continue folic acid 1mg QD, MVI 1 tab QD and thiamine 100mg QD  -S/P keppra 1g IV X 1, ativan 2mg X 2, [id number] IV X 1 in ER  -will place on CIWA  -will f/u U/A  -will f/u official report of CXRAY  -will place on fall precautions  -will place on seizure precautions  -would consider neurology consult in AM  -will f/u urine tox screen    2.Thrombocytopenia   -platelet count 56   -will monitor for now    3.DVT PPx  -will place B/L LE SCDs

## 2023-11-24 NOTE — PATIENT PROFILE ADULT - NUMBER OF YRS
[facility] DIVISION OF KIDNEY DISEASES AND HYPERTENSION -- FOLLOW UP NOTE  --------------------------------------------------------------------------------  Chief Complaint: HTN    24 hour events/subjective:  Pt seen and examined this AM  Unable to obtain ROS  No overnight events      PAST HISTORY  --------------------------------------------------------------------------------  No significant changes to PMH, PSH, FHx, SHx, unless otherwise noted    ALLERGIES & MEDICATIONS  --------------------------------------------------------------------------------  Allergies    No Known Allergies    Intolerances      Standing Inpatient Medications  amLODIPine   Tablet 10 milliGRAM(s) Oral daily  aspirin  chewable 162 milliGRAM(s) Oral daily  benztropine 0.5 milliGRAM(s) Oral at bedtime  clonazePAM Tablet 0.5 milliGRAM(s) Oral two times a day  dextrose 5%. 1000 milliLiter(s) IV Continuous <Continuous>  dextrose 50% Injectable 12.5 Gram(s) IV Push once  dextrose 50% Injectable 25 Gram(s) IV Push once  dextrose 50% Injectable 25 Gram(s) IV Push once  hydrALAZINE 100 milliGRAM(s) Oral every 8 hours  insulin lispro (HumaLOG) corrective regimen sliding scale   SubCutaneous three times a day before meals  labetalol 200 milliGRAM(s) Oral every 12 hours  lamoTRIgine 50 milliGRAM(s) Oral two times a day  paliperidone ER. 9 milliGRAM(s) Oral daily  potassium chloride    Tablet ER 40 milliEquivalent(s) Oral once    PRN Inpatient Medications  dextrose Gel 1 Dose(s) Oral once PRN  glucagon  Injectable 1 milliGRAM(s) IntraMuscular once PRN  hydrALAZINE Injectable 10 milliGRAM(s) IV Push every 6 hours PRN  LORazepam   Injectable 1 milliGRAM(s) IV Push once PRN  traZODone 100 milliGRAM(s) Oral at bedtime PRN      REVIEW OF SYSTEMS  --------------------------------------------------------------------------------  Unable to obtain    VITALS/PHYSICAL EXAM  --------------------------------------------------------------------------------  T(C): 36.8 (04-28-18 @ 10:58), Max: 37.3 (04-28-18 @ 07:31)  HR: 94 (04-28-18 @ 14:21) (69 - 104)  BP: 155/72 (04-28-18 @ 14:21) (140/88 - 195/105)  RR: 22 (04-28-18 @ 10:58) (19 - 22)  SpO2: 97% (04-28-18 @ 10:58) (97% - 100%)  Wt(kg): --  Height (cm): 165.1 (04-26-18 @ 19:21)  Weight (kg): 113.4 (04-26-18 @ 19:21)  BMI (kg/m2): 41.6 (04-26-18 @ 19:21)  BSA (m2): 2.17 (04-26-18 @ 19:21)      04-28-18 @ 07:01  -  04-28-18 @ 15:16  --------------------------------------------------------  IN: 0 mL / OUT: 900 mL / NET: -900 mL      Physical Exam:  	Gen: NAD  	HEENT: PERRL, supple neck, clear oropharynx  	Pulm: CTA B/L  	CV: RRR, S1S2; no rub  	Back: No spinal or CVA tenderness; no sacral edema  	Abd: +BS, soft, nontender/nondistended  	: No suprapubic tenderness  	UE: Warm, FROM, no clubbing, intact strength; no edema; no asterixis  	LE: Warm, FROM, no clubbing, intact strength; no edema  	Neuro: No focal deficits  	Skin: Warm, without rashes  	Vascular access:    LABS/STUDIES  --------------------------------------------------------------------------------              12.2   11.1  >-----------<  385      [04-28-18 @ 08:04]              36.3     139  |  97  |  29.0  ----------------------------<  124      [04-28-18 @ 08:01]  3.3   |  28.0  |  2.56        Ca     9.5     [04-28-18 @ 08:01]    TPro  7.6  /  Alb  4.1  /  TBili  <0.2  /  DBili  x   /  AST  158  /  ALT  206  /  AlkPhos  85  [04-26-18 @ 20:26]        Troponin 0.08      [04-28-18 @ 08:01]    Creatinine Trend:  SCr 2.56 [04-28 @ 08:01]  SCr 2.83 [04-26 @ 20:26]    Urinalysis - [04-26-18 @ 22:39]      Color Yellow / Appearance Clear / SG 1.015 / pH 6.0      Gluc Negative / Ketone Negative  / Bili Negative / Urobili Negative       Blood Large / Protein 500 / Leuk Est Negative / Nitrite Negative      RBC 0-2 / WBC 0-2 / Hyaline  / Gran  / Sq Epi  / Non Sq Epi Few / Bacteria Occasional      Ferritin 129      [04-27-18 @ 07:19]  HbA1c 5.3      [04-28-18 @ 08:03]    HBsAg Nonreact      [04-27-18 @ 18:18]  HCV 0.16, Nonreact      [04-27-18 @ 18:18] 30

## 2023-11-24 NOTE — H&P ADULT - HISTORY OF PRESENT ILLNESS
This is a 53 year old male w/ PMHx of polysubstance abuse - alcohol, tobacco and marijuana presents for evaluation of multiple witnessed seizures.   This is a 53 year old male w/ PMHx of polysubstance abuse - alcohol, tobacco and marijuana presents for evaluation of multiple witnessed seizures by family as he was sitting chatting.  He states had previous episode years prior during COVID, and was attributed to infection.  States has 2 beers 4x a week, last drink while at Thanksgiving dinner.   Had 2 episodes of watery, nonbloody diarrhea 1 week prior, self resolved without treatment.

## 2023-11-24 NOTE — H&P ADULT - NSHPPHYSICALEXAM_GEN_ALL_CORE
PHYSICAL EXAM:  Vital Signs:  Vital Signs (24 Hrs):  T(C): 36.7 (11-23-23 @ 23:23), Max: 37.1 (11-23-23 @ 18:12)  HR: 105 (11-23-23 @ 23:23) (88 - 122)  BP: 141/91 (11-23-23 @ 23:23) (141/91 - 145/79)  RR: 20 (11-23-23 @ 23:23) (16 - 26)  SpO2: 97% (11-23-23 @ 23:23) (97% - 98%)    General: well appearing/toxic appearing/chronically ill appearing and in no acute distress   HENT: atraumatic, normocephalic, oropharynx pink and moist, sinuses nontender, normal nasal mucosa/turbinates, thyroid not enlarged or tender, no radiating carotid murmur or bruit and no masses; no JVD  Eyes: pupil equally round and reactive to light (PERRLA) bilaterally, extra-ocular muscle intact (EOMI) bilaterally, lids/conjunctiva normal bilaterally and anicteric bilaterally  Neck: normal, supple, no adenopathy and thyroid normal in size, no nodules or tenderness  CV: normal s1, s2 , regular rhythm, no murmurs, no rubs and no gallops  Lungs: clear to auscultation no w/r/r  Abd: normal bowel sounds, no hepatosplenomegaly, non-tender, non-distended and no masses  Musculoskeletal: no clubbing, cyanosis and full range of motion of joints: right upper extremity, left upper extremity, right lower extremity and left lower extremity;  2+ Peripheral Pulses, No clubbing, cyanosis, or edema  Neuro: alert, awake & oriented times three (AA&O x 3), cranial Nerves II-XII intact and normal strength  Psych: normal judgment and insight, normal mood/affect and non-anxious  Vascular: 2+ radial and dorsalis pedis pulses B/L equal and symmetric  Skin: no rash, warm and dry PHYSICAL EXAM:  Vital Signs:  Vital Signs (24 Hrs):  T(C): 36.7 (11-23-23 @ 23:23), Max: 37.1 (11-23-23 @ 18:12)  HR: 105 (11-23-23 @ 23:23) (88 - 122)  BP: 141/91 (11-23-23 @ 23:23) (141/91 - 145/79)  RR: 20 (11-23-23 @ 23:23) (16 - 26)  SpO2: 97% (11-23-23 @ 23:23) (97% - 98%)    General: chronically ill appearing and in no acute distress   HENT: atraumatic, normocephalic, oropharynx pink and moist, sinuses nontender, normal nasal mucosa/turbinates, thyroid not enlarged or tender, no radiating carotid murmur or bruit and no masses; no JVD  Eyes: pupil equally round and reactive to light (PERRLA) bilaterally, extra-ocular muscle intact (EOMI) bilaterally, lids/conjunctiva normal bilaterally and anicteric bilaterally  Neck: normal, supple, no adenopathy and thyroid normal in size, no nodules or tenderness  CV: normal s1, s2 , regular rhythm, no murmurs, no rubs and no gallops  Lungs: clear to auscultation no w/r/r  Abd: normal bowel sounds, no hepatosplenomegaly, non-tender, non-distended and no masses  Musculoskeletal: no clubbing, cyanosis and full range of motion of joints: right upper extremity, left upper extremity, right lower extremity and left lower extremity;  2+ Peripheral Pulses, No clubbing, cyanosis, or edema  Neuro: alert, awake & oriented times three (AA&O x 3), cranial Nerves II-XII intact and normal strength  Psych: normal judgment and insight, normal mood/affect and non-anxious  Vascular: 2+ radial and dorsalis pedis pulses B/L equal and symmetric  Skin: no rash, warm and dry

## 2023-11-24 NOTE — H&P ADULT - NSHPREVIEWOFSYSTEMS_GEN_ALL_CORE
He denies any fever, chills, cough, sore throat, rhinorrhea, chest pain, SOB, palpitations, abdominal pain, nausea, vomiting, dysuria, diarrhea, weakness, or lethargy. He denies any fever, chills, cough, sore throat, rhinorrhea, chest pain, SOB, abdominal pain, nausea, vomiting, dysuria, weakness, or lethargy.

## 2023-11-24 NOTE — SBIRT NOTE ADULT - NSSBIRTSCREENAVAIL_GEN_A_CORE
Discharge Summary   Patient ID:   Heather Serna   [id number]   63 year old   1958   Admit date: 10/22/2021   Discharge date: 10/23/2021   Admitting Physician: Lucia Ambrose MD   Discharge Physician: No att. providers found     Primary Diagnoses:   Principal Problem:    Chest pain  Active Problems:    COPD (chronic obstructive pulmonary disease) (CMS/[facility])    Bronchiectasis without complication (CMS/[facility])    Bladder infection  Resolved Problems:    * No resolved hospital problems. *     Secondary Diagnoses:   Past Medical History:   Diagnosis Date   • Anemia    • Bronchitis    • Bulging of lumbar intervertebral disc    • Chronic pain    • COPD (chronic obstructive pulmonary disease) (CMS/[facility])    • Pneumonia    • Urinary tract infection         Hospital Course By Problem List (see H&P for details of admission):       Chest pain, atypical  · Troponin's negative x 3  · Treadmill Stress test w/o ischemia or perfusion mismatch  · Aspirin daily  · Nitroglycerin p.r.n.  · Atorvastatin - declines, PCP to revisit for risk reduction  · 2D echo - EF 60% - WNLs     Lung mass  COPD  Bronchiectasis  · Chest x-ray showing Biapical pleural-parenchymal thickening scarring and fibronodular airspace opacities. Bilateral bronchiectasis. Mild scarring involving the RIGHT middle lobe and lingular segments.   · CT chest consistent w/ CXR findings, also reporting enlarging LLL nodule  · Reviewed w/ Pulmonology, will follow-up with her outpatient in next 7-10 days  · Continue home therapies     Low back pain, chronic  H/o herniated L5 disc  · Continue home therapies     Leukocytosis likely 2/2 UTI - resolved  UTI  · Early finding of E Coli  · Ceftriaxone transitioned to Vantin BID x 7 days upon discharge  · Await urine culture  · Blood culture x 2 - PCP to follow-up with results      Consults      Procedures performed     XR Chest PA and Lateral    Result Date: 10/22/2021  EXAM:  XR CHEST PA AND LATERAL CLINICAL  No HISTORY:  Chest pain [facility] TECHNIQUE:  XR CHEST PA AND LATERAL COMPARISON:  February 1, 2021 FINDINGS:  The lungs remain hyperinflated. There are nodular airspace opacities involving the LEFT mid to lower lung zone. Fiber nodular changes noted involving both lung apices. The cardiomediastinum is normal. There is no significant effusion or pneumothorax. The osseous structures and subcutaneous tissues are normal.     IMPRESSION: Biapical pleural-parenchymal thickening scarring and fibronodular airspace opacities. Bilateral bronchiectasis. Mild scarring involving the RIGHT middle lobe and lingular segments.     NM Myocardial Perfusion Rest/Stress Mult    Result Date: 10/23/2021  NUCLEAR MEDICINE MYOCARDIAL PERFUSION SCAN HISTORY: Chest pain COMPARISON: None. PROCEDURE: This is a one day protocol. The patient was given 4.5 mCi of technetium 99m Cardiolite intravenously and a SPECT myocardial perfusion scan was obtained. After an approximately 2 hours delay the patient was exercised using Eros protocol achieving a peak workload of 7.0 mets. The patient's peak heart rate was 155 beats per minute that is98% of maximum age predicted heart rate. At peak stress the patient was given 14.7 mCi of technetium 99m Cardiolite intravenously and a stress gated SPECT myocardial perfusion scan was obtained. FINDINGS: The SPECT images demonstrate no fixed or reversible perfusion defect. The gated images demonstrate good wall motion and thickening throughout the left ventricle. The left ventricular ejection fraction is 62%. The end-diastolic volume is 53 mL. The TID is  0.99. The lung heart ratio is 0.27.  SSS is 0.0.     IMPRESSION: 1. No scintigraphic evidence of exercise-induced ischemia. Results discussed with Dr. Tay at 1145 on 10/23/2021.     CT CHEST WO CONTRAST    Result Date: 10/22/2021  CT CHEST WO CONTRAST CLINICAL HISTORY:  Chest pain or SOB, pleurisy or effusion suspected COMPARISON:  2/1/2021 and 1/10/2020 TECHNIQUE:  CT images of the chest were obtained WITHOUT contrast. Sagittal and coronal reformatted images are obtained. Thick section MIP images were also performed. FINDINGS: Heart and Mediastinum: Stable. Mildly enlarged middle mediastinal/precarinal lymph node. Calcific coronary artery disease. Suspect small hiatus hernia. Vascular: Normal caliber aorta and pulmonary arteries. Lung Parenchyma: Centrilobular emphysema with bronchiectasis, bronchial wall thickening and nodular airspace consolidation with areas of tree-in-bud nodularity and peripheral bronchial mucous plugging. There are subsegmental consolidation in the right upper lobe (3/51), right middle lobe, lingula and left lower lobe (3/64). There is at least one enlarging nodular lesion Pleural Spaces:  No effusion or pneumothorax. Chest wall, Axilla and Supraclavicular Regions:  Stable Osseous Structures:  Stable     IMPRESSION: 1.  Worsening bilateral airspace opacity, peripheral bronchial mucous plugging and multifocal bilateral consolidation as compared to 2/1/2021. 2.  Enlarging nodule in the left lower lobe periphery (3/71) now measuring 5 mm (previously 3 mm). 3.  No effusion.       Discharge Exam   Blood pressure (!) 154/68, pulse 79, temperature 99.3 °F (37.4 °C), temperature source Oral, resp. rate 16, height 5' 6\" (1.676 m), weight 55.2 kg (121 lb 11.1 oz), SpO2 96 %.   General: A&O x3, in NAD    Lungs: Course, diminished bases, minimal cough  Heart: RRR      Activity: As tolerated     Diet: Cardia    Code Status: Full Resuscitation     Pending issues to be followed up by PCP   · Completion of antibiotic for UTI ( noted for E Coli )  · Follow-up on Blood Cx's 10/22/21  · Follow-up with Pulmonology  · Revisit discussion of initiation of STATIN for risk reduction; continuation of daily ASA  · Re-evaluate possible GERD and use of PPI if chest pains persist  · Ongoing management of chronic medical conditions      Discharge Medication List:       What to Do with  Your Medications      START taking these medications today unless otherwise stated      Details   aspirin 81 MG chewable tablet      Chew 1 tablet by mouth daily. Do not start before October 24, 2021.  Authorizing Provider: Darya De La Cruz NP  Start Date: October 24, 2021     cefpodoxime 100 MG tablet  Commonly known as: VANTIN      Take 1 tablet by mouth 2 times daily for 7 days.  Authorizing Provider: Darya De La Cruz NP                     CONTINUE taking these medications which have NOT CHANGED      Details   DULoxetine 60 MG capsule  Commonly known as: CYMBALTA      Take 1 capsule by mouth daily.  Authorizing Provider: SHARA Paz     ibuprofen 200 MG tablet  Commonly known as: MOTRIN      Take 400 mg by mouth every 6 hours as needed for Pain.     ipratropium-albuterol 0.5-2.5 (3) MG/3ML nebulizer solution  Commonly known as: DUONEB      USE 1 VIAL VIA NEBULIZER EVERY 6 HOURS AS NEEDED FOR WHEEZING  Authorizing Provider: Ronen Cote MD     Iron 325 (65 Fe) MG Tab      Take 1 tablet by mouth daily.     tiZANidine 2 MG tablet  Commonly known as: ZANAFLEX      Take 1 tablet by mouth nightly as needed (tight muscles).  Authorizing Provider: SHARA Paz                        Where to Get Your Medications      These medications were sent to [id number] - [place], [place] - [address]  [address] [address]    Phone: [phone]   · cefpodoxime 100 MG tablet     You are receiving a paper prescription for the following medications, you can take these to any pharmacy.    You don't need a prescription for these medications  · aspirin 81 MG chewable tablet           Disposition: patient is being discharged to home    Follow-up with:   SHARA Paz  [address]  [place] [address] [phone]    In 1 week  Hospital follow up.   Clinic will call you with appointment     Ronen Cote MD  [address]  [place]  [address]  [phone]    In 1 week  Hospital follow up.  Office will call you to arrange.        Time spent on discharge was less than 30 minutes   Signed:   Darya De La Cruz NP   10/23/2021   12:54 PM       Attending Attestation    No chest pain this AM.  No sob abdominal pain, fevers or chills.      Physical Exam  VS reviewed  GENERAL: female in no acute distress  HEENT: Normocephalic, Atraumatic. EOMI  CARDIOVASCULAR: RRR  RESPIRATORY:Clear and unlabored  NEUROLOGIC: Alert and Oriented x 3, no focal motor or sensory deficits appreciated.      Atypical chest pain: stress test negative for ischemia    COPD, bronchiectasis: CT with LLL nodule.  Follow up with pulmonary    UTI: continue antibiotics    Patient questions answered.  Remainder of plan as outlined above.     Patient seen and examined by myself on date of service listed above and I agree with the above assessment and plan as outlined by the Advance Practice Clinician.        Electronically Signed 10/25/2021 7:14 AM   Spencer Tay MD  Department of Hospital Medicine

## 2023-11-24 NOTE — H&P ADULT - NSHPLABSRESULTS_GEN_ALL_CORE
labs personally reviewed and pertinent [facility] documents/labs/diagnostics reviewed                         13.4   5.37  )-----------( 56       ( 23 Nov 2023 18:45 )             35.8       11-23    124<L>  |  79<L>  |  6.2<L>  ----------------------------<  110<H>  3.2<L>   |  21.0<L>  |  1.08    Ca    8.3<L>      23 Nov 2023 18:45  Phos  2.6     11-23  Mg     2.0     11-23    TPro  6.0<L>  /  Alb  3.5  /  TBili  2.1<H>  /  DBili  x   /  AST  344<H>  /  ALT  113<H>  /  AlkPhos  140<H>  11-23    Urinalysis Basic - ( 23 Nov 2023 18:45 )    Color: x / Appearance: x / SG: x / pH: x  Gluc: 110 mg/dL / Ketone: x  / Bili: x / Urobili: x   Blood: x / Protein: x / Nitrite: x   Leuk Esterase: x / RBC: x / WBC x   Sq Epi: x / Non Sq Epi: x / Bacteria: x    EKG:     RADIOLOGY:  11/23/24 CT head w/o contrast-  IMPRESSION:  No large territory acute infarct, intracranial hemorrhage, or mass effect.  Mild sequelae of chronic microangiopathic white matter changes and   parenchymal volume loss. MRI brain is more sensitive for the evaluation   of epileptogenic foci and/or postictal change. labs personally reviewed and pertinent [facility] documents/labs/diagnostics reviewed                         13.4   5.37  )-----------( 56       ( 23 Nov 2023 18:45 )             35.8       11-23    124<L>  |  79<L>  |  6.2<L>  ----------------------------<  110<H>  3.2<L>   |  21.0<L>  |  1.08    Ca    8.3<L>      23 Nov 2023 18:45  Phos  2.6     11-23  Mg     2.0     11-23    TPro  6.0<L>  /  Alb  3.5  /  TBili  2.1<H>  /  DBili  x   /  AST  344<H>  /  ALT  113<H>  /  AlkPhos  140<H>  11-23    Urinalysis Basic - ( 23 Nov 2023 18:45 )    Color: x / Appearance: x / SG: x / pH: x  Gluc: 110 mg/dL / Ketone: x  / Bili: x / Urobili: x   Blood: x / Protein: x / Nitrite: x   Leuk Esterase: x / RBC: x / WBC x   Sq Epi: x / Non Sq Epi: x / Bacteria: x    EKG: read by me, sinus tachycardia at 118 bpm, RBBB; QTc 0.462    RADIOLOGY:  11/23/24 CT head w/o contrast-  IMPRESSION:  No large territory acute infarct, intracranial hemorrhage, or mass effect.  Mild sequelae of chronic microangiopathic white matter changes and   parenchymal volume loss. MRI brain is more sensitive for the evaluation   of epileptogenic foci and/or postictal change.

## 2023-11-24 NOTE — PATIENT PROFILE ADULT - FALL HARM RISK - RISK INTERVENTIONS
What Is The Reason For Today's Visit?: Annual Full Body Skin Examination with No Concerns
Assistance OOB with selected safe patient handling equipment/Assistance with ambulation/Communicate Fall Risk and Risk Factors to all staff, patient, and family/Monitor for mental status changes/Monitor gait and stability/Reinforce activity limits and safety measures with patient and family/Toileting schedule using arm’s reach rule for commode and bathroom/Use of alarms - bed, chair and/or voice tab/Visual Cue: Yellow wristband/Bed in lowest position, wheels locked, appropriate side rails in place/Call bell, personal items and telephone in reach/Instruct patient to call for assistance before getting out of bed or chair/Non-slip footwear when patient is out of bed/[place] to call system/Physically safe environment - no spills, clutter or unnecessary equipment/Purposeful Proactive Rounding/Room/bathroom lighting operational, light cord in reach

## 2023-11-24 NOTE — ED ADULT NURSE REASSESSMENT NOTE - NS ED NURSE REASSESS COMMENT FT1
Report given to CDU SOFYA Hilton. Pt breathing even and unlabored, no acute distress noted. Pt able to make needs known. Pt to be moved to CDU 6R.

## 2023-11-24 NOTE — CHART NOTE - NSCHARTNOTEFT_GEN_A_CORE
patient seen and eval.   a/w multiple witnessed seizures by family as he was sitting chatting  multiple electrolyte abnormalities   alcohol abuse  hyponatremia sodium 124 on admission/ now 130   c/w ivf , f/u repeat labs / urine lytes   seizure precautions , seizure likely due to etoh use   cth no acute changes    S/P keppra 1g IV X 1, ativan 2mg X 2, [id number] IV X 1 in ER, hold further antiseizure meds   c/w ciwa protocol , benzo taper with prn   neuro consult if further seizure episodes

## 2023-11-25 LAB
ALBUMIN SERPL ELPH-MCNC: 3.3 G/DL — SIGNIFICANT CHANGE UP (ref 3.3–5.2)
ALBUMIN SERPL ELPH-MCNC: 3.3 G/DL — SIGNIFICANT CHANGE UP (ref 3.3–5.2)
ALP SERPL-CCNC: 113 U/L — SIGNIFICANT CHANGE UP (ref 40–120)
ALP SERPL-CCNC: 113 U/L — SIGNIFICANT CHANGE UP (ref 40–120)
ALT FLD-CCNC: 89 U/L — HIGH
ALT FLD-CCNC: 89 U/L — HIGH
ANION GAP SERPL CALC-SCNC: 16 MMOL/L — SIGNIFICANT CHANGE UP (ref 5–17)
ANION GAP SERPL CALC-SCNC: 16 MMOL/L — SIGNIFICANT CHANGE UP (ref 5–17)
AST SERPL-CCNC: 224 U/L — HIGH
AST SERPL-CCNC: 224 U/L — HIGH
BILIRUB SERPL-MCNC: 2.4 MG/DL — HIGH (ref 0.4–2)
BILIRUB SERPL-MCNC: 2.4 MG/DL — HIGH (ref 0.4–2)
BUN SERPL-MCNC: 6.5 MG/DL — LOW (ref 8–20)
BUN SERPL-MCNC: 6.5 MG/DL — LOW (ref 8–20)
CALCIUM SERPL-MCNC: 7.9 MG/DL — LOW (ref 8.4–10.5)
CALCIUM SERPL-MCNC: 7.9 MG/DL — LOW (ref 8.4–10.5)
CHLORIDE SERPL-SCNC: 87 MMOL/L — LOW (ref 96–108)
CHLORIDE SERPL-SCNC: 87 MMOL/L — LOW (ref 96–108)
CO2 SERPL-SCNC: 25 MMOL/L — SIGNIFICANT CHANGE UP (ref 22–29)
CO2 SERPL-SCNC: 25 MMOL/L — SIGNIFICANT CHANGE UP (ref 22–29)
CREAT SERPL-MCNC: 0.53 MG/DL — SIGNIFICANT CHANGE UP (ref 0.5–1.3)
CREAT SERPL-MCNC: 0.53 MG/DL — SIGNIFICANT CHANGE UP (ref 0.5–1.3)
EGFR: 120 ML/MIN/1.73M2 — SIGNIFICANT CHANGE UP
EGFR: 120 ML/MIN/1.73M2 — SIGNIFICANT CHANGE UP
GLUCOSE BLDC GLUCOMTR-MCNC: 132 MG/DL — HIGH (ref 70–99)
GLUCOSE BLDC GLUCOMTR-MCNC: 132 MG/DL — HIGH (ref 70–99)
GLUCOSE BLDC GLUCOMTR-MCNC: 51 MG/DL — CRITICAL LOW (ref 70–99)
GLUCOSE BLDC GLUCOMTR-MCNC: 51 MG/DL — CRITICAL LOW (ref 70–99)
GLUCOSE BLDC GLUCOMTR-MCNC: 57 MG/DL — LOW (ref 70–99)
GLUCOSE BLDC GLUCOMTR-MCNC: 57 MG/DL — LOW (ref 70–99)
GLUCOSE SERPL-MCNC: 45 MG/DL — CRITICAL LOW (ref 70–99)
GLUCOSE SERPL-MCNC: 45 MG/DL — CRITICAL LOW (ref 70–99)
HCT VFR BLD CALC: 37.4 % — LOW (ref 39–50)
HCT VFR BLD CALC: 37.4 % — LOW (ref 39–50)
HGB BLD-MCNC: 13.5 G/DL — SIGNIFICANT CHANGE UP (ref 13–17)
HGB BLD-MCNC: 13.5 G/DL — SIGNIFICANT CHANGE UP (ref 13–17)
MAGNESIUM SERPL-MCNC: 1.5 MG/DL — LOW (ref 1.8–2.6)
MAGNESIUM SERPL-MCNC: 1.5 MG/DL — LOW (ref 1.8–2.6)
MCHC RBC-ENTMCNC: 36.1 GM/DL — HIGH (ref 32–36)
MCHC RBC-ENTMCNC: 36.1 GM/DL — HIGH (ref 32–36)
MCHC RBC-ENTMCNC: 38.5 PG — HIGH (ref 27–34)
MCHC RBC-ENTMCNC: 38.5 PG — HIGH (ref 27–34)
MCV RBC AUTO: 106.6 FL — HIGH (ref 80–100)
MCV RBC AUTO: 106.6 FL — HIGH (ref 80–100)
PHOSPHATE SERPL-MCNC: 2.5 MG/DL — SIGNIFICANT CHANGE UP (ref 2.4–4.7)
PHOSPHATE SERPL-MCNC: 2.5 MG/DL — SIGNIFICANT CHANGE UP (ref 2.4–4.7)
PLATELET # BLD AUTO: 55 K/UL — LOW (ref 150–400)
PLATELET # BLD AUTO: 55 K/UL — LOW (ref 150–400)
POTASSIUM SERPL-MCNC: 3.7 MMOL/L — SIGNIFICANT CHANGE UP (ref 3.5–5.3)
POTASSIUM SERPL-MCNC: 3.7 MMOL/L — SIGNIFICANT CHANGE UP (ref 3.5–5.3)
POTASSIUM SERPL-SCNC: 3.7 MMOL/L — SIGNIFICANT CHANGE UP (ref 3.5–5.3)
POTASSIUM SERPL-SCNC: 3.7 MMOL/L — SIGNIFICANT CHANGE UP (ref 3.5–5.3)
PROT SERPL-MCNC: 5.4 G/DL — LOW (ref 6.6–8.7)
PROT SERPL-MCNC: 5.4 G/DL — LOW (ref 6.6–8.7)
RBC # BLD: 3.51 M/UL — LOW (ref 4.2–5.8)
RBC # BLD: 3.51 M/UL — LOW (ref 4.2–5.8)
RBC # FLD: 13.3 % — SIGNIFICANT CHANGE UP (ref 10.3–14.5)
RBC # FLD: 13.3 % — SIGNIFICANT CHANGE UP (ref 10.3–14.5)
SODIUM SERPL-SCNC: 128 MMOL/L — LOW (ref 135–145)
SODIUM SERPL-SCNC: 128 MMOL/L — LOW (ref 135–145)
WBC # BLD: 6.86 K/UL — SIGNIFICANT CHANGE UP (ref 3.8–10.5)
WBC # BLD: 6.86 K/UL — SIGNIFICANT CHANGE UP (ref 3.8–10.5)
WBC # FLD AUTO: 6.86 K/UL — SIGNIFICANT CHANGE UP (ref 3.8–10.5)
WBC # FLD AUTO: 6.86 K/UL — SIGNIFICANT CHANGE UP (ref 3.8–10.5)

## 2023-11-25 PROCEDURE — 99233 SBSQ HOSP IP/OBS HIGH 50: CPT

## 2023-11-25 RX ORDER — DEXTROSE 50 % IN WATER 50 %
50 SYRINGE (ML) INTRAVENOUS ONCE
Refills: 0 | Status: COMPLETED | OUTPATIENT
Start: 2023-11-25 | End: 2023-11-25

## 2023-11-25 RX ORDER — THIAMINE MONONITRATE (VIT B1) 100 MG
500 TABLET ORAL EVERY 8 HOURS
Refills: 0 | Status: COMPLETED | OUTPATIENT
Start: 2023-11-25 | End: 2023-11-28

## 2023-11-25 RX ORDER — MAGNESIUM SULFATE 500 MG/ML
2 VIAL (ML) INJECTION ONCE
Refills: 0 | Status: COMPLETED | OUTPATIENT
Start: 2023-11-25 | End: 2023-11-25

## 2023-11-25 RX ADMIN — SODIUM CHLORIDE 75 MILLILITER(S): 9 INJECTION INTRAMUSCULAR; INTRAVENOUS; SUBCUTANEOUS at 23:26

## 2023-11-25 RX ADMIN — Medication 25 GRAM(S): at 09:15

## 2023-11-25 RX ADMIN — Medication 1.5 MILLIGRAM(S): at 06:02

## 2023-11-25 RX ADMIN — Medication 1.5 MILLIGRAM(S): at 15:00

## 2023-11-25 RX ADMIN — Medication 1 PATCH: at 19:48

## 2023-11-25 RX ADMIN — Medication 105 MILLIGRAM(S): at 15:00

## 2023-11-25 RX ADMIN — Medication 1.5 MILLIGRAM(S): at 02:00

## 2023-11-25 RX ADMIN — Medication 50 MILLILITER(S): at 09:15

## 2023-11-25 RX ADMIN — Medication 1.5 MILLIGRAM(S): at 10:15

## 2023-11-25 RX ADMIN — Medication 1.5 MILLIGRAM(S): at 17:35

## 2023-11-25 RX ADMIN — Medication 1 PATCH: at 17:35

## 2023-11-25 RX ADMIN — Medication 1.5 MILLIGRAM(S): at 23:12

## 2023-11-25 RX ADMIN — Medication 105 MILLIGRAM(S): at 23:13

## 2023-11-25 RX ADMIN — SODIUM CHLORIDE 75 MILLILITER(S): 9 INJECTION INTRAMUSCULAR; INTRAVENOUS; SUBCUTANEOUS at 09:15

## 2023-11-25 NOTE — PROVIDER CONTACT NOTE (CRITICAL VALUE NOTIFICATION) - BACKGROUND
Pt. was admitted for a witnessed seizure; [facility] protocol in place.

## 2023-11-25 NOTE — PROGRESS NOTE ADULT - ASSESSMENT
53M w/ PMHx of polysubstance abuse - alcohol, tobacco and marijuana presents for evaluation of multiple witnessed seizures by family     Witnessed seizures + multiple electrolyte abnormalities w/ hx of alcohol abuse  ETOH Withdrawal  Na improved to 128  Cont ativan taper   -CT head w/o contrast as above  -S/P 2L NS in ER, will continue  -S/P folic acid 1mg po X 1, MVI 1 tab po X 1, thiamine 100mg po X 1 in ER, will continue folic acid 1mg QD, MVI 1 tab QD and thiamine 100mg QD  -S/P keppra 1g IV X 1, ativan 2mg X 2, [id number] IV X 1 in ER  -will place on CIWA  -will f/u U/A  -will f/u official report of CXRAY  -will place on fall precautions  -will place on seizure precautions  -would consider neurology consult in AM  -will f/u urine tox screen    2.Thrombocytopenia   -platelet count 56   -will monitor for now    3.DVT PPx  -will place B/L LE SCDs   53M w/ PMHx of polysubstance abuse - alcohol, tobacco and marijuana presents for evaluation of multiple witnessed seizures by family     Witnessed seizures + multiple electrolyte abnormalities w/ hx of alcohol abuse  ETOH Abuse  Na improved to 128  Started on Ativan taper   Deferring EEG as patient on benzos  Cont ativan taper   CT head w/o contrast as above  IV Thiamine   IV Mag    Thrombocytopenia   -platelet count 56 on admission  -will monitor for now      Dispo: Acute

## 2023-11-25 NOTE — PROGRESS NOTE ADULT - SUBJECTIVE AND OBJECTIVE BOX
[facility] Division of Hospital Medicine      SUBJECTIVE / OVERNIGHT EVENTS:  No events    Patient denies chest pain, SOB, abd pain, N/V, fever, chills, dysuria or any other complaints. All remainder ROS negative.     MEDICATIONS  (STANDING):  folic acid 1 milliGRAM(s) Oral daily  LORazepam   Injectable   IV Push   LORazepam   Injectable 1.5 milliGRAM(s) IV Push every 4 hours  multivitamin 1 Tablet(s) Oral daily  nicotine - 21 mG/24Hr(s) Patch 1 Patch Transdermal daily  sodium chloride 0.9%. 1000 milliLiter(s) (75 mL/Hr) IV Continuous <Continuous>  thiamine 100 milliGRAM(s) Oral daily    MEDICATIONS  (PRN):  aluminum hydroxide/magnesium hydroxide/simethicone Suspension 30 milliLiter(s) Oral every 4 hours PRN Dyspepsia  LORazepam   Injectable 2 milliGRAM(s) IV Push every 1 hour PRN CIWA > 8  ondansetron Injectable 4 milliGRAM(s) IV Push every 8 hours PRN Nausea and/or Vomiting        I&O's Summary      PHYSICAL EXAM:  Vital Signs Last 24 Hrs  T(C): 37.1 (25 Nov 2023 11:07), Max: 37.4 (25 Nov 2023 06:02)  T(F): 98.7 (25 Nov 2023 11:07), Max: 99.3 (25 Nov 2023 06:02)  HR: 80 (25 Nov 2023 11:07) (80 - 122)  BP: 142/95 (25 Nov 2023 11:07) (119/76 - 163/61)  BP(mean): 90 (24 Nov 2023 19:35) (90 - 90)  RR: 18 (25 Nov 2023 11:07) (18 - 18)  SpO2: 97% (25 Nov 2023 11:07) (93% - 98%)    Parameters below as of 25 Nov 2023 11:07  Patient On (Oxygen Delivery Method): room air            CONSTITUTIONAL: NAD, appears stated age  ENMT: Moist oral mucosa, no pharyngeal injection or exudates; normal dentition  RESPIRATORY: Normal respiratory effort; clear to auscultation bilaterally  CARDIOVASCULAR: Regular rate and rhythm, normal S1 and S2, no murmur/rub/gallop; Peripheral pulses are 2+ bilaterally  ABDOMEN: Nontender to palpation, normoactive bowel sounds, no rebound/guarding;   MUSCLOSKELETAL:  No clubbing or cyanosis of digits; no joint swelling or tenderness to palpation  PSYCH: A+O to person, place, and time; affect appropriate  NEUROLOGY: CN 2-12 are intact and symmetric; no gross sensory deficits;   SKIN: No rashes; no palpable lesions    LABS:                        13.5   6.86  )-----------( 55       ( 25 Nov 2023 06:39 )             37.4     11-25    128<L>  |  87<L>  |  6.5<L>  ----------------------------<  45<LL>  3.7   |  25.0  |  0.53    Ca    7.9<L>      25 Nov 2023 06:39  Phos  2.5     11-25  Mg     1.5     11-25    TPro  5.4<L>  /  Alb  3.3  /  TBili  2.4<H>  /  DBili  x   /  AST  224<H>  /  ALT  89<H>  /  AlkPhos  113  11-25    PT/INR - ( 24 Nov 2023 03:51 )   PT: 12.8 sec;   INR: 1.16 ratio         PTT - ( 24 Nov 2023 03:51 )  PTT:31.0 sec      Urinalysis Basic - ( 25 Nov 2023 06:39 )    Color: x / Appearance: x / SG: x / pH: x  Gluc: 45 mg/dL / Ketone: x  / Bili: x / Urobili: x   Blood: x / Protein: x / Nitrite: x   Leuk Esterase: x / RBC: x / WBC x   Sq Epi: x / Non Sq Epi: x / Bacteria: x        CAPILLARY BLOOD GLUCOSE      POCT Blood Glucose.: 132 mg/dL (25 Nov 2023 09:40)  POCT Blood Glucose.: 51 mg/dL (25 Nov 2023 09:08)  POCT Blood Glucose.: 57 mg/dL (25 Nov 2023 09:07)        RADIOLOGY & ADDITIONAL TESTS:  Results Reviewed:   Imaging Personally Reviewed:  Electrocardiogram Personally Reviewed:

## 2023-11-26 LAB
ALBUMIN SERPL ELPH-MCNC: 3 G/DL — LOW (ref 3.3–5.2)
ALBUMIN SERPL ELPH-MCNC: 3 G/DL — LOW (ref 3.3–5.2)
ALBUMIN SERPL ELPH-MCNC: 3.1 G/DL — LOW (ref 3.3–5.2)
ALBUMIN SERPL ELPH-MCNC: 3.1 G/DL — LOW (ref 3.3–5.2)
ALP SERPL-CCNC: 107 U/L — SIGNIFICANT CHANGE UP (ref 40–120)
ALP SERPL-CCNC: 107 U/L — SIGNIFICANT CHANGE UP (ref 40–120)
ALP SERPL-CCNC: 98 U/L — SIGNIFICANT CHANGE UP (ref 40–120)
ALP SERPL-CCNC: 98 U/L — SIGNIFICANT CHANGE UP (ref 40–120)
ALT FLD-CCNC: 74 U/L — HIGH
ALT FLD-CCNC: 74 U/L — HIGH
ALT FLD-CCNC: 81 U/L — HIGH
ALT FLD-CCNC: 81 U/L — HIGH
ANION GAP SERPL CALC-SCNC: 16 MMOL/L — SIGNIFICANT CHANGE UP (ref 5–17)
ANION GAP SERPL CALC-SCNC: 16 MMOL/L — SIGNIFICANT CHANGE UP (ref 5–17)
ANISOCYTOSIS BLD QL: SIGNIFICANT CHANGE UP
ANISOCYTOSIS BLD QL: SIGNIFICANT CHANGE UP
AST SERPL-CCNC: 146 U/L — HIGH
AST SERPL-CCNC: 146 U/L — HIGH
AST SERPL-CCNC: 170 U/L — HIGH
AST SERPL-CCNC: 170 U/L — HIGH
BASOPHILS # BLD AUTO: 0.04 K/UL — SIGNIFICANT CHANGE UP (ref 0–0.2)
BASOPHILS # BLD AUTO: 0.04 K/UL — SIGNIFICANT CHANGE UP (ref 0–0.2)
BASOPHILS NFR BLD AUTO: 0.5 % — SIGNIFICANT CHANGE UP (ref 0–2)
BASOPHILS NFR BLD AUTO: 0.5 % — SIGNIFICANT CHANGE UP (ref 0–2)
BILIRUB DIRECT SERPL-MCNC: 1.2 MG/DL — HIGH (ref 0–0.3)
BILIRUB DIRECT SERPL-MCNC: 1.2 MG/DL — HIGH (ref 0–0.3)
BILIRUB INDIRECT FLD-MCNC: 0.9 MG/DL — SIGNIFICANT CHANGE UP (ref 0.2–1)
BILIRUB INDIRECT FLD-MCNC: 0.9 MG/DL — SIGNIFICANT CHANGE UP (ref 0.2–1)
BILIRUB SERPL-MCNC: 2.1 MG/DL — HIGH (ref 0.4–2)
BILIRUB SERPL-MCNC: 2.1 MG/DL — HIGH (ref 0.4–2)
BILIRUB SERPL-MCNC: 2.8 MG/DL — HIGH (ref 0.4–2)
BILIRUB SERPL-MCNC: 2.8 MG/DL — HIGH (ref 0.4–2)
BUN SERPL-MCNC: 9.2 MG/DL — SIGNIFICANT CHANGE UP (ref 8–20)
BUN SERPL-MCNC: 9.2 MG/DL — SIGNIFICANT CHANGE UP (ref 8–20)
CALCIUM SERPL-MCNC: 7.9 MG/DL — LOW (ref 8.4–10.5)
CALCIUM SERPL-MCNC: 7.9 MG/DL — LOW (ref 8.4–10.5)
CHLORIDE SERPL-SCNC: 89 MMOL/L — LOW (ref 96–108)
CHLORIDE SERPL-SCNC: 89 MMOL/L — LOW (ref 96–108)
CO2 SERPL-SCNC: 26 MMOL/L — SIGNIFICANT CHANGE UP (ref 22–29)
CO2 SERPL-SCNC: 26 MMOL/L — SIGNIFICANT CHANGE UP (ref 22–29)
CREAT SERPL-MCNC: 0.59 MG/DL — SIGNIFICANT CHANGE UP (ref 0.5–1.3)
CREAT SERPL-MCNC: 0.59 MG/DL — SIGNIFICANT CHANGE UP (ref 0.5–1.3)
EGFR: 116 ML/MIN/1.73M2 — SIGNIFICANT CHANGE UP
EGFR: 116 ML/MIN/1.73M2 — SIGNIFICANT CHANGE UP
EOSINOPHIL # BLD AUTO: 0.01 K/UL — SIGNIFICANT CHANGE UP (ref 0–0.5)
EOSINOPHIL # BLD AUTO: 0.01 K/UL — SIGNIFICANT CHANGE UP (ref 0–0.5)
EOSINOPHIL NFR BLD AUTO: 0.1 % — SIGNIFICANT CHANGE UP (ref 0–6)
EOSINOPHIL NFR BLD AUTO: 0.1 % — SIGNIFICANT CHANGE UP (ref 0–6)
GLUCOSE SERPL-MCNC: 77 MG/DL — SIGNIFICANT CHANGE UP (ref 70–99)
GLUCOSE SERPL-MCNC: 77 MG/DL — SIGNIFICANT CHANGE UP (ref 70–99)
HCT VFR BLD CALC: 39 % — SIGNIFICANT CHANGE UP (ref 39–50)
HCT VFR BLD CALC: 39 % — SIGNIFICANT CHANGE UP (ref 39–50)
HGB BLD-MCNC: 14.4 G/DL — SIGNIFICANT CHANGE UP (ref 13–17)
HGB BLD-MCNC: 14.4 G/DL — SIGNIFICANT CHANGE UP (ref 13–17)
IMM GRANULOCYTES NFR BLD AUTO: 1.3 % — HIGH (ref 0–0.9)
IMM GRANULOCYTES NFR BLD AUTO: 1.3 % — HIGH (ref 0–0.9)
LYMPHOCYTES # BLD AUTO: 0.62 K/UL — LOW (ref 1–3.3)
LYMPHOCYTES # BLD AUTO: 0.62 K/UL — LOW (ref 1–3.3)
LYMPHOCYTES # BLD AUTO: 7.5 % — LOW (ref 13–44)
LYMPHOCYTES # BLD AUTO: 7.5 % — LOW (ref 13–44)
MACROCYTES BLD QL: SIGNIFICANT CHANGE UP
MACROCYTES BLD QL: SIGNIFICANT CHANGE UP
MAGNESIUM SERPL-MCNC: 1.6 MG/DL — SIGNIFICANT CHANGE UP (ref 1.6–2.6)
MAGNESIUM SERPL-MCNC: 1.6 MG/DL — SIGNIFICANT CHANGE UP (ref 1.6–2.6)
MANUAL SMEAR VERIFICATION: SIGNIFICANT CHANGE UP
MANUAL SMEAR VERIFICATION: SIGNIFICANT CHANGE UP
MCHC RBC-ENTMCNC: 36.9 GM/DL — HIGH (ref 32–36)
MCHC RBC-ENTMCNC: 36.9 GM/DL — HIGH (ref 32–36)
MCHC RBC-ENTMCNC: 38.8 PG — HIGH (ref 27–34)
MCHC RBC-ENTMCNC: 38.8 PG — HIGH (ref 27–34)
MCV RBC AUTO: 105.1 FL — HIGH (ref 80–100)
MCV RBC AUTO: 105.1 FL — HIGH (ref 80–100)
MONOCYTES # BLD AUTO: 0.82 K/UL — SIGNIFICANT CHANGE UP (ref 0–0.9)
MONOCYTES # BLD AUTO: 0.82 K/UL — SIGNIFICANT CHANGE UP (ref 0–0.9)
MONOCYTES NFR BLD AUTO: 10 % — SIGNIFICANT CHANGE UP (ref 2–14)
MONOCYTES NFR BLD AUTO: 10 % — SIGNIFICANT CHANGE UP (ref 2–14)
NEUTROPHILS # BLD AUTO: 6.63 K/UL — SIGNIFICANT CHANGE UP (ref 1.8–7.4)
NEUTROPHILS # BLD AUTO: 6.63 K/UL — SIGNIFICANT CHANGE UP (ref 1.8–7.4)
NEUTROPHILS NFR BLD AUTO: 80.6 % — HIGH (ref 43–77)
NEUTROPHILS NFR BLD AUTO: 80.6 % — HIGH (ref 43–77)
PHOSPHATE SERPL-MCNC: 2.2 MG/DL — LOW (ref 2.4–4.7)
PHOSPHATE SERPL-MCNC: 2.2 MG/DL — LOW (ref 2.4–4.7)
PLAT MORPH BLD: NORMAL — SIGNIFICANT CHANGE UP
PLAT MORPH BLD: NORMAL — SIGNIFICANT CHANGE UP
PLATELET # BLD AUTO: 59 K/UL — LOW (ref 150–400)
PLATELET # BLD AUTO: 59 K/UL — LOW (ref 150–400)
POLYCHROMASIA BLD QL SMEAR: SLIGHT — SIGNIFICANT CHANGE UP
POLYCHROMASIA BLD QL SMEAR: SLIGHT — SIGNIFICANT CHANGE UP
POTASSIUM SERPL-MCNC: 3.2 MMOL/L — LOW (ref 3.5–5.3)
POTASSIUM SERPL-MCNC: 3.2 MMOL/L — LOW (ref 3.5–5.3)
POTASSIUM SERPL-SCNC: 3.2 MMOL/L — LOW (ref 3.5–5.3)
POTASSIUM SERPL-SCNC: 3.2 MMOL/L — LOW (ref 3.5–5.3)
PROT SERPL-MCNC: 5.3 G/DL — LOW (ref 6.6–8.7)
PROT SERPL-MCNC: 5.3 G/DL — LOW (ref 6.6–8.7)
PROT SERPL-MCNC: 5.7 G/DL — LOW (ref 6.6–8.7)
PROT SERPL-MCNC: 5.7 G/DL — LOW (ref 6.6–8.7)
RBC # BLD: 3.71 M/UL — LOW (ref 4.2–5.8)
RBC # BLD: 3.71 M/UL — LOW (ref 4.2–5.8)
RBC # FLD: 13.3 % — SIGNIFICANT CHANGE UP (ref 10.3–14.5)
RBC # FLD: 13.3 % — SIGNIFICANT CHANGE UP (ref 10.3–14.5)
RBC BLD AUTO: ABNORMAL
RBC BLD AUTO: ABNORMAL
SARS-COV-2 RNA SPEC QL NAA+PROBE: DETECTED
SARS-COV-2 RNA SPEC QL NAA+PROBE: DETECTED
SODIUM SERPL-SCNC: 131 MMOL/L — LOW (ref 135–145)
SODIUM SERPL-SCNC: 131 MMOL/L — LOW (ref 135–145)
WBC # BLD: 8.23 K/UL — SIGNIFICANT CHANGE UP (ref 3.8–10.5)
WBC # BLD: 8.23 K/UL — SIGNIFICANT CHANGE UP (ref 3.8–10.5)
WBC # FLD AUTO: 8.23 K/UL — SIGNIFICANT CHANGE UP (ref 3.8–10.5)
WBC # FLD AUTO: 8.23 K/UL — SIGNIFICANT CHANGE UP (ref 3.8–10.5)

## 2023-11-26 PROCEDURE — 99233 SBSQ HOSP IP/OBS HIGH 50: CPT

## 2023-11-26 RX ORDER — POTASSIUM CHLORIDE 20 MEQ
10 PACKET (EA) ORAL
Refills: 0 | Status: COMPLETED | OUTPATIENT
Start: 2023-11-26 | End: 2023-11-26

## 2023-11-26 RX ORDER — SODIUM CHLORIDE 9 MG/ML
1000 INJECTION, SOLUTION INTRAVENOUS
Refills: 0 | Status: DISCONTINUED | OUTPATIENT
Start: 2023-11-26 | End: 2023-11-29

## 2023-11-26 RX ORDER — POTASSIUM CHLORIDE 20 MEQ
20 PACKET (EA) ORAL ONCE
Refills: 0 | Status: COMPLETED | OUTPATIENT
Start: 2023-11-26 | End: 2023-11-27

## 2023-11-26 RX ORDER — POTASSIUM PHOSPHATE, MONOBASIC POTASSIUM PHOSPHATE, DIBASIC 236; 224 MG/ML; MG/ML
15 INJECTION, SOLUTION INTRAVENOUS ONCE
Refills: 0 | Status: COMPLETED | OUTPATIENT
Start: 2023-11-26 | End: 2023-11-26

## 2023-11-26 RX ORDER — POTASSIUM CHLORIDE 20 MEQ
40 PACKET (EA) ORAL ONCE
Refills: 0 | Status: DISCONTINUED | OUTPATIENT
Start: 2023-11-26 | End: 2023-11-26

## 2023-11-26 RX ADMIN — Medication 1 MILLIGRAM(S): at 05:55

## 2023-11-26 RX ADMIN — Medication 105 MILLIGRAM(S): at 22:55

## 2023-11-26 RX ADMIN — Medication 1 PATCH: at 19:00

## 2023-11-26 RX ADMIN — Medication 1 MILLIGRAM(S): at 12:00

## 2023-11-26 RX ADMIN — Medication 1 MILLIGRAM(S): at 17:08

## 2023-11-26 RX ADMIN — Medication 1 PATCH: at 07:51

## 2023-11-26 RX ADMIN — Medication 1 PATCH: at 12:00

## 2023-11-26 RX ADMIN — Medication 105 MILLIGRAM(S): at 06:42

## 2023-11-26 RX ADMIN — Medication 1 PATCH: at 12:08

## 2023-11-26 RX ADMIN — SODIUM CHLORIDE 75 MILLILITER(S): 9 INJECTION INTRAMUSCULAR; INTRAVENOUS; SUBCUTANEOUS at 03:15

## 2023-11-26 RX ADMIN — Medication 1 MILLIGRAM(S): at 03:14

## 2023-11-26 RX ADMIN — POTASSIUM PHOSPHATE, MONOBASIC POTASSIUM PHOSPHATE, DIBASIC 62.5 MILLIMOLE(S): 236; 224 INJECTION, SOLUTION INTRAVENOUS at 17:08

## 2023-11-26 RX ADMIN — SODIUM CHLORIDE 75 MILLILITER(S): 9 INJECTION, SOLUTION INTRAVENOUS at 09:18

## 2023-11-26 RX ADMIN — Medication 105 MILLIGRAM(S): at 15:50

## 2023-11-26 NOTE — PROGRESS NOTE ADULT - ASSESSMENT
53M w/ PMHx of polysubstance abuse - alcohol, tobacco and marijuana presents for evaluation of multiple witnessed seizures by family     #Witnessed seizures  #multiple electrolyte abnormalities w/ hx of alcohol abuse  #ETOH Abuse  -[facility] protocol in place  -Na improved to 128  -C/w Ativan taper   -Deferring EEG as patient on benzos  -CT head w/o contrast as above  -IV Thiamine   -IV Mag    #Thrombocytopenia   -platelet count 56 on admission  -will monitor for now      Dispo: Acute   53M w/ PMHx of polysubstance abuse - alcohol, tobacco and marijuana presents for evaluation of multiple witnessed seizures by family     #Witnessed seizures  #multiple electrolyte abnormalities w/ hx of alcohol abuse  #Possible beer potomania  #ETOH Abuse  -[facility] protocol in plac  -11/25, most recent Na, improved to 128  -labs pending  -C/w Ativan taper   -Deferring EEG as patient on benzos  -CT head w/o contrast as above  -IV Thiamine   -IV Mag    #Thrombocytopenia   -platelet count 56 on admission  -will monitor for now    Trend labs, monitor for seizure activity/withdrawal    Dispo: Acute   53M w/ PMHx of polysubstance abuse - alcohol, tobacco and marijuana presents for evaluation of multiple witnessed seizures by family     #Witnessed seizures  #multiple electrolyte abnormalities w/ hx of alcohol abuse  #Possible beer potomania  #ETOH Abuse  -megaloblastic anemia, likely 2/2 alcohol abuse  -CIWA protocol in [facility]  -11/25, most recent Na, improved to 128  -C/w Ativan taper   -Deferring EEG as patient on benzos  -CT head w/o contrast as above  -IV Thiamine   -IV Mag    #Thrombocytopenia   -platelet count 56 on admission  -will monitor for now    Trend labs, monitor for seizure activity/withdrawal    Dispo: Acute

## 2023-11-26 NOTE — PROGRESS NOTE ADULT - SUBJECTIVE AND OBJECTIVE BOX
This is a 53 year old male w/ PMHx of polysubstance abuse - alcohol, tobacco and marijuana presents for evaluation of multiple witnessed seizures by family as he was sitting chatting.  He states had previous episode years prior during COVID, and was attributed to infection.  States has 2 beers 4x a week, last drink while at Thanksgiving dinner.   Had 2 episodes of watery, nonbloody diarrhea 1 week prior, self resolved without treatment.    ROS:    MEDICATIONS  (STANDING):  folic acid 1 milliGRAM(s) Oral daily  LORazepam   Injectable   IV Push   LORazepam   Injectable 1 milliGRAM(s) IV Push every 4 hours  multivitamin 1 Tablet(s) Oral daily  nicotine - 21 mG/24Hr(s) Patch 1 Patch Transdermal daily  sodium chloride 0.9%. 1000 milliLiter(s) (75 mL/Hr) IV Continuous <Continuous>  thiamine IVPB 500 milliGRAM(s) IV Intermittent every 8 hours    MEDICATIONS  (PRN):  aluminum hydroxide/magnesium hydroxide/simethicone Suspension 30 milliLiter(s) Oral every 4 hours PRN Dyspepsia  LORazepam   Injectable 2 milliGRAM(s) IV Push every 1 hour PRN CIWA > 8  ondansetron Injectable 4 milliGRAM(s) IV Push every 8 hours PRN Nausea and/or Vomiting      Allergies    No Known Allergies    Intolerances          Vital Signs Last 24 Hrs  T(C): 37.4 (26 Nov 2023 05:05), Max: 37.4 (26 Nov 2023 05:05)  T(F): 99.4 (26 Nov 2023 05:05), Max: 99.4 (26 Nov 2023 05:05)  HR: 116 (26 Nov 2023 05:05) (80 - 118)  BP: 152/93 (26 Nov 2023 05:05) (142/95 - 156/81)  BP(mean): --  RR: 18 (26 Nov 2023 05:05) (18 - 18)  SpO2: 96% (26 Nov 2023 05:05) (96% - 98%)    Parameters below as of 26 Nov 2023 05:05  Patient On (Oxygen Delivery Method): room air        PHYSICAL EXAM:      LABS:                        13.5   6.86  )-----------( 55       ( 25 Nov 2023 06:39 )             37.4     11-25    128<L>  |  87<L>  |  6.5<L>  ----------------------------<  45<LL>  3.7   |  25.0  |  0.53    Ca    7.9<L>      25 Nov 2023 06:39  Phos  2.5     11-25  Mg     1.5     11-25    TPro  5.4<L>  /  Alb  3.3  /  TBili  2.4<H>  /  DBili  x   /  AST  224<H>  /  ALT  89<H>  /  AlkPhos  113  11-25      Urinalysis Basic - ( 25 Nov 2023 06:39 )    Color: x / Appearance: x / SG: x / pH: x  Gluc: 45 mg/dL / Ketone: x  / Bili: x / Urobili: x   Blood: x / Protein: x / Nitrite: x   Leuk Esterase: x / RBC: x / WBC x   Sq Epi: x / Non Sq Epi: x / Bacteria: x      CAPILLARY BLOOD GLUCOSE      POCT Blood Glucose.: 132 mg/dL (25 Nov 2023 09:40)  POCT Blood Glucose.: 51 mg/dL (25 Nov 2023 09:08)  POCT Blood Glucose.: 57 mg/dL (25 Nov 2023 09:07)      RADIOLOGY & ADDITIONAL TESTS:      Imaging Personally Reviewed:  [  ] YES  [  ] NO    Consultant(s) Notes Reviewed:  [  ] YES  [  ] NO    Care Discussed with Consultants/Other Providers [  ] YES  [  ] NO    Plan of Care discussed with Housestaff [ X ]YES [  ] NO This is a 53 year old male w/ PMHx of polysubstance abuse - alcohol, tobacco and marijuana presents for evaluation of multiple witnessed seizures by family as he was sitting chatting.  He states had previous episode years prior during COVID, and was attributed to infection.  States has 2 beers 4x a week, last drink while at [place] dinner.   Had 2 episodes of watery, nonbloody diarrhea 1 week prior, self resolved without treatment.    Last night 8:45 PM, patient had blood glucose 45. IV dextrose ordered, CIWA in place. No seizures overnight. This morning patient was resting in bed. Nurse had concern for aspiration, patient presented with gurgling. Speech/swallow evaluation ordered, NS changed to D5 NS in setting of hypoglycemic episode last night.     ROS:  CONSTITUTIONAL: Denies fever, chills, fatigue  HEENT: Denies acute changes in vision and hearing  CARDIO: Denies CP, SOB, palpitations  PULM: Denies cough, wheezing, SOB  ABD: Denies abd pain, n/v/d/c  : Denies dysuria, urinary frequency  NEURO: Denies HA, numbness/tingling  EXTREMITIES: Denies LE swelling, calf pain      MEDICATIONS  (STANDING):  folic acid 1 milliGRAM(s) Oral daily  LORazepam   Injectable   IV Push   LORazepam   Injectable 1 milliGRAM(s) IV Push every 4 hours  multivitamin 1 Tablet(s) Oral daily  nicotine - 21 mG/24Hr(s) Patch 1 Patch Transdermal daily  sodium chloride 0.9%. 1000 milliLiter(s) (75 mL/Hr) IV Continuous <Continuous>  thiamine IVPB 500 milliGRAM(s) IV Intermittent every 8 hours    MEDICATIONS  (PRN):  aluminum hydroxide/magnesium hydroxide/simethicone Suspension 30 milliLiter(s) Oral every 4 hours PRN Dyspepsia  LORazepam   Injectable 2 milliGRAM(s) IV Push every 1 hour PRN CIWA > 8  ondansetron Injectable 4 milliGRAM(s) IV Push every 8 hours PRN Nausea and/or Vomiting      Allergies    No Known Allergies    Intolerances          Vital Signs Last 24 Hrs  T(C): 37.4 (26 Nov 2023 05:05), Max: 37.4 (26 Nov 2023 05:05)  T(F): 99.4 (26 Nov 2023 05:05), Max: 99.4 (26 Nov 2023 05:05)  HR: 116 (26 Nov 2023 05:05) (80 - 118)  BP: 152/93 (26 Nov 2023 05:05) (142/95 - 156/81)  BP(mean): --  RR: 18 (26 Nov 2023 05:05) (18 - 18)  SpO2: 96% (26 Nov 2023 05:05) (96% - 98%)    Parameters below as of 26 Nov 2023 05:05  Patient On (Oxygen Delivery Method): room air        PHYSICAL EXAM:  GENERAL: No acute distress  HEENT:  Atraumatic, normocephalic, no cervical LAD, neck supple, no JVD, thyroid normal  NEURO/PSYCH:  Lethargic upon waking  LUNGS: CTAB, no wrr, non-labored breathing  HEART: RRR, no murmur appreciated  ABD: Soft, non-tender, non-distended, no organomegaly, no appreciable masses, +bs all 4 quadrants  EXTREMITIES:  Nontender, no clubbing, cyanosis, or edema  SKIN: No rashes or lesions      LABS:                        13.5   6.86  )-----------( 55       ( 25 Nov 2023 06:39 )             37.4     11-25    128<L>  |  87<L>  |  6.5<L>  ----------------------------<  45<LL>  3.7   |  25.0  |  0.53    Ca    7.9<L>      25 Nov 2023 06:39  Phos  2.5     11-25  Mg     1.5     11-25    TPro  5.4<L>  /  Alb  3.3  /  TBili  2.4<H>  /  DBili  x   /  AST  224<H>  /  ALT  89<H>  /  AlkPhos  113  11-25      Urinalysis Basic - ( 25 Nov 2023 06:39 )    Color: x / Appearance: x / SG: x / pH: x  Gluc: 45 mg/dL / Ketone: x  / Bili: x / Urobili: x   Blood: x / Protein: x / Nitrite: x   Leuk Esterase: x / RBC: x / WBC x   Sq Epi: x / Non Sq Epi: x / Bacteria: x      CAPILLARY BLOOD GLUCOSE      POCT Blood Glucose.: 132 mg/dL (25 Nov 2023 09:40)  POCT Blood Glucose.: 51 mg/dL (25 Nov 2023 09:08)  POCT Blood Glucose.: 57 mg/dL (25 Nov 2023 09:07)      RADIOLOGY & ADDITIONAL TESTS:      Imaging Personally Reviewed:  [  ] YES  [  ] NO    Consultant(s) Notes Reviewed:  [  ] YES  [  ] NO    Care Discussed with Consultants/Other Providers [  ] YES  [  ] NO    Plan of Care discussed with Housestaff [ X ]YES [  ] NO This is a 53 year old male w/ PMHx of polysubstance abuse - alcohol, tobacco and marijuana presents for evaluation of multiple witnessed seizures by family as he was sitting chatting.  He states had previous episode years prior during COVID, and was attributed to infection.  States has 2 beers 4x a week, last drink while at [place] dinner.   Had 2 episodes of watery, nonbloody diarrhea 1 week prior, self resolved without treatment.    Last night 8:45 PM, patient had blood glucose 45. IV dextrose ordered, CIWA in place. No seizures overnight. This morning patient was resting in bed. Nurse had concern for aspiration, patient presented with gurgling. Speech/swallow evaluation ordered, NS changed to D5 NS in setting of hypoglycemic episode last night.     ROS:  CONSTITUTIONAL: Denies fever, chills, fatigue  HEENT: Denies acute changes in vision and hearing  CARDIO: Denies CP, SOB, palpitations  PULM: Denies cough, wheezing, SOB  ABD: Denies abd pain, n/v/d/c  : Denies dysuria, urinary frequency  NEURO: Denies HA, numbness/tingling  EXTREMITIES: Denies LE swelling, calf pain      MEDICATIONS  (STANDING):  folic acid 1 milliGRAM(s) Oral daily  LORazepam   Injectable   IV Push   LORazepam   Injectable 1 milliGRAM(s) IV Push every 4 hours  multivitamin 1 Tablet(s) Oral daily  nicotine - 21 mG/24Hr(s) Patch 1 Patch Transdermal daily  sodium chloride 0.9%. 1000 milliLiter(s) (75 mL/Hr) IV Continuous <Continuous>  thiamine IVPB 500 milliGRAM(s) IV Intermittent every 8 hours    MEDICATIONS  (PRN):  aluminum hydroxide/magnesium hydroxide/simethicone Suspension 30 milliLiter(s) Oral every 4 hours PRN Dyspepsia  LORazepam   Injectable 2 milliGRAM(s) IV Push every 1 hour PRN CIWA > 8  ondansetron Injectable 4 milliGRAM(s) IV Push every 8 hours PRN Nausea and/or Vomiting      Allergies    No Known Allergies    Intolerances          Vital Signs Last 24 Hrs  T(C): 37.4 (26 Nov 2023 05:05), Max: 37.4 (26 Nov 2023 05:05)  T(F): 99.4 (26 Nov 2023 05:05), Max: 99.4 (26 Nov 2023 05:05)  HR: 116 (26 Nov 2023 05:05) (80 - 118)  BP: 152/93 (26 Nov 2023 05:05) (142/95 - 156/81)  BP(mean): --  RR: 18 (26 Nov 2023 05:05) (18 - 18)  SpO2: 96% (26 Nov 2023 05:05) (96% - 98%)    Parameters below as of 26 Nov 2023 05:05  Patient On (Oxygen Delivery Method): room air        PHYSICAL EXAM:  GENERAL: No acute distress, fidgeting consistent with etoh withdrawal, +snoring   HEENT:  Atraumatic, normocephalic, +scleral icterus, no cervical LAD, neck supple, no JVD, thyroid normal  NEURO/PSYCH:  Lethargic upon waking  LUNGS: CTAB, no wrr, non-labored breathing  HEART: RRR, no murmur appreciated  ABD: Soft, non-tender, non-distended, no organomegaly, no appreciable masses, +bs all 4 quadrants  EXTREMITIES:  Nontender, no clubbing, cyanosis, or edema  SKIN: No rashes or lesions      LABS:                        13.5   6.86  )-----------( 55       ( 25 Nov 2023 06:39 )             37.4     11-25    128<L>  |  87<L>  |  6.5<L>  ----------------------------<  45<LL>  3.7   |  25.0  |  0.53    Ca    7.9<L>      25 Nov 2023 06:39  Phos  2.5     11-25  Mg     1.5     11-25    TPro  5.4<L>  /  Alb  3.3  /  TBili  2.4<H>  /  DBili  x   /  AST  224<H>  /  ALT  89<H>  /  AlkPhos  113  11-25      Urinalysis Basic - ( 25 Nov 2023 06:39 )    Color: x / Appearance: x / SG: x / pH: x  Gluc: 45 mg/dL / Ketone: x  / Bili: x / Urobili: x   Blood: x / Protein: x / Nitrite: x   Leuk Esterase: x / RBC: x / WBC x   Sq Epi: x / Non Sq Epi: x / Bacteria: x      CAPILLARY BLOOD GLUCOSE      POCT Blood Glucose.: 132 mg/dL (25 Nov 2023 09:40)  POCT Blood Glucose.: 51 mg/dL (25 Nov 2023 09:08)  POCT Blood Glucose.: 57 mg/dL (25 Nov 2023 09:07)      RADIOLOGY & ADDITIONAL TESTS:      Imaging Personally Reviewed:  [  ] YES  [  ] NO    Consultant(s) Notes Reviewed:  [  ] YES  [  ] NO    Care Discussed with Consultants/Other Providers [  ] YES  [  ] NO    Plan of Care discussed with Housestaff [ X ]YES [  ] NO

## 2023-11-27 LAB
ALBUMIN SERPL ELPH-MCNC: 2.7 G/DL — LOW (ref 3.3–5.2)
ALBUMIN SERPL ELPH-MCNC: 2.7 G/DL — LOW (ref 3.3–5.2)
ALBUMIN SERPL ELPH-MCNC: 3 G/DL — LOW (ref 3.3–5.2)
ALBUMIN SERPL ELPH-MCNC: 3 G/DL — LOW (ref 3.3–5.2)
ALP SERPL-CCNC: 97 U/L — SIGNIFICANT CHANGE UP (ref 40–120)
ALP SERPL-CCNC: 97 U/L — SIGNIFICANT CHANGE UP (ref 40–120)
ALP SERPL-CCNC: 99 U/L — SIGNIFICANT CHANGE UP (ref 40–120)
ALP SERPL-CCNC: 99 U/L — SIGNIFICANT CHANGE UP (ref 40–120)
ALT FLD-CCNC: 68 U/L — HIGH
ALT FLD-CCNC: 68 U/L — HIGH
ALT FLD-CCNC: 74 U/L — HIGH
ALT FLD-CCNC: 74 U/L — HIGH
ANION GAP SERPL CALC-SCNC: 14 MMOL/L — SIGNIFICANT CHANGE UP (ref 5–17)
ANION GAP SERPL CALC-SCNC: 14 MMOL/L — SIGNIFICANT CHANGE UP (ref 5–17)
ANION GAP SERPL CALC-SCNC: 17 MMOL/L — SIGNIFICANT CHANGE UP (ref 5–17)
ANION GAP SERPL CALC-SCNC: 17 MMOL/L — SIGNIFICANT CHANGE UP (ref 5–17)
AST SERPL-CCNC: 147 U/L — HIGH
AST SERPL-CCNC: 147 U/L — HIGH
AST SERPL-CCNC: 153 U/L — HIGH
AST SERPL-CCNC: 153 U/L — HIGH
BASOPHILS # BLD AUTO: 0.03 K/UL — SIGNIFICANT CHANGE UP (ref 0–0.2)
BASOPHILS # BLD AUTO: 0.03 K/UL — SIGNIFICANT CHANGE UP (ref 0–0.2)
BASOPHILS # BLD AUTO: 0.04 K/UL — SIGNIFICANT CHANGE UP (ref 0–0.2)
BASOPHILS # BLD AUTO: 0.04 K/UL — SIGNIFICANT CHANGE UP (ref 0–0.2)
BASOPHILS NFR BLD AUTO: 0.5 % — SIGNIFICANT CHANGE UP (ref 0–2)
BILIRUB DIRECT SERPL-MCNC: 1.1 MG/DL — HIGH (ref 0–0.3)
BILIRUB DIRECT SERPL-MCNC: 1.1 MG/DL — HIGH (ref 0–0.3)
BILIRUB INDIRECT FLD-MCNC: 1.2 MG/DL — HIGH (ref 0.2–1)
BILIRUB INDIRECT FLD-MCNC: 1.2 MG/DL — HIGH (ref 0.2–1)
BILIRUB SERPL-MCNC: 2.2 MG/DL — HIGH (ref 0.4–2)
BILIRUB SERPL-MCNC: 2.2 MG/DL — HIGH (ref 0.4–2)
BILIRUB SERPL-MCNC: 2.3 MG/DL — HIGH (ref 0.4–2)
BILIRUB SERPL-MCNC: 2.3 MG/DL — HIGH (ref 0.4–2)
BUN SERPL-MCNC: 10.6 MG/DL — SIGNIFICANT CHANGE UP (ref 8–20)
BUN SERPL-MCNC: 10.6 MG/DL — SIGNIFICANT CHANGE UP (ref 8–20)
BUN SERPL-MCNC: 11.2 MG/DL — SIGNIFICANT CHANGE UP (ref 8–20)
BUN SERPL-MCNC: 11.2 MG/DL — SIGNIFICANT CHANGE UP (ref 8–20)
CALCIUM SERPL-MCNC: 8 MG/DL — LOW (ref 8.4–10.5)
CHLORIDE SERPL-SCNC: 90 MMOL/L — LOW (ref 96–108)
CHLORIDE SERPL-SCNC: 90 MMOL/L — LOW (ref 96–108)
CHLORIDE SERPL-SCNC: 91 MMOL/L — LOW (ref 96–108)
CHLORIDE SERPL-SCNC: 91 MMOL/L — LOW (ref 96–108)
CO2 SERPL-SCNC: 23 MMOL/L — SIGNIFICANT CHANGE UP (ref 22–29)
CO2 SERPL-SCNC: 23 MMOL/L — SIGNIFICANT CHANGE UP (ref 22–29)
CO2 SERPL-SCNC: 25 MMOL/L — SIGNIFICANT CHANGE UP (ref 22–29)
CO2 SERPL-SCNC: 25 MMOL/L — SIGNIFICANT CHANGE UP (ref 22–29)
CREAT SERPL-MCNC: 0.5 MG/DL — SIGNIFICANT CHANGE UP (ref 0.5–1.3)
CREAT SERPL-MCNC: 0.5 MG/DL — SIGNIFICANT CHANGE UP (ref 0.5–1.3)
CREAT SERPL-MCNC: 0.51 MG/DL — SIGNIFICANT CHANGE UP (ref 0.5–1.3)
CREAT SERPL-MCNC: 0.51 MG/DL — SIGNIFICANT CHANGE UP (ref 0.5–1.3)
EGFR: 121 ML/MIN/1.73M2 — SIGNIFICANT CHANGE UP
EGFR: 121 ML/MIN/1.73M2 — SIGNIFICANT CHANGE UP
EGFR: 122 ML/MIN/1.73M2 — SIGNIFICANT CHANGE UP
EGFR: 122 ML/MIN/1.73M2 — SIGNIFICANT CHANGE UP
EOSINOPHIL # BLD AUTO: 0.04 K/UL — SIGNIFICANT CHANGE UP (ref 0–0.5)
EOSINOPHIL # BLD AUTO: 0.04 K/UL — SIGNIFICANT CHANGE UP (ref 0–0.5)
EOSINOPHIL # BLD AUTO: 0.09 K/UL — SIGNIFICANT CHANGE UP (ref 0–0.5)
EOSINOPHIL # BLD AUTO: 0.09 K/UL — SIGNIFICANT CHANGE UP (ref 0–0.5)
EOSINOPHIL NFR BLD AUTO: 0.6 % — SIGNIFICANT CHANGE UP (ref 0–6)
EOSINOPHIL NFR BLD AUTO: 0.6 % — SIGNIFICANT CHANGE UP (ref 0–6)
EOSINOPHIL NFR BLD AUTO: 1.2 % — SIGNIFICANT CHANGE UP (ref 0–6)
EOSINOPHIL NFR BLD AUTO: 1.2 % — SIGNIFICANT CHANGE UP (ref 0–6)
GLUCOSE SERPL-MCNC: 79 MG/DL — SIGNIFICANT CHANGE UP (ref 70–99)
GLUCOSE SERPL-MCNC: 79 MG/DL — SIGNIFICANT CHANGE UP (ref 70–99)
GLUCOSE SERPL-MCNC: 85 MG/DL — SIGNIFICANT CHANGE UP (ref 70–99)
GLUCOSE SERPL-MCNC: 85 MG/DL — SIGNIFICANT CHANGE UP (ref 70–99)
GRAM STN FLD: SIGNIFICANT CHANGE UP
GRAM STN FLD: SIGNIFICANT CHANGE UP
HCT VFR BLD CALC: 34.8 % — LOW (ref 39–50)
HCT VFR BLD CALC: 34.8 % — LOW (ref 39–50)
HCT VFR BLD CALC: 36 % — LOW (ref 39–50)
HCT VFR BLD CALC: 36 % — LOW (ref 39–50)
HGB BLD-MCNC: 12.6 G/DL — LOW (ref 13–17)
HGB BLD-MCNC: 12.6 G/DL — LOW (ref 13–17)
HGB BLD-MCNC: 13.1 G/DL — SIGNIFICANT CHANGE UP (ref 13–17)
HGB BLD-MCNC: 13.1 G/DL — SIGNIFICANT CHANGE UP (ref 13–17)
IMM GRANULOCYTES NFR BLD AUTO: 0.8 % — SIGNIFICANT CHANGE UP (ref 0–0.9)
IMM GRANULOCYTES NFR BLD AUTO: 0.8 % — SIGNIFICANT CHANGE UP (ref 0–0.9)
IMM GRANULOCYTES NFR BLD AUTO: 1 % — HIGH (ref 0–0.9)
IMM GRANULOCYTES NFR BLD AUTO: 1 % — HIGH (ref 0–0.9)
LYMPHOCYTES # BLD AUTO: 0.61 K/UL — LOW (ref 1–3.3)
LYMPHOCYTES # BLD AUTO: 0.61 K/UL — LOW (ref 1–3.3)
LYMPHOCYTES # BLD AUTO: 0.62 K/UL — LOW (ref 1–3.3)
LYMPHOCYTES # BLD AUTO: 0.62 K/UL — LOW (ref 1–3.3)
LYMPHOCYTES # BLD AUTO: 8.3 % — LOW (ref 13–44)
LYMPHOCYTES # BLD AUTO: 8.3 % — LOW (ref 13–44)
LYMPHOCYTES # BLD AUTO: 9.8 % — LOW (ref 13–44)
LYMPHOCYTES # BLD AUTO: 9.8 % — LOW (ref 13–44)
MAGNESIUM SERPL-MCNC: 1.5 MG/DL — LOW (ref 1.6–2.6)
MCHC RBC-ENTMCNC: 36.2 GM/DL — HIGH (ref 32–36)
MCHC RBC-ENTMCNC: 36.2 GM/DL — HIGH (ref 32–36)
MCHC RBC-ENTMCNC: 36.4 GM/DL — HIGH (ref 32–36)
MCHC RBC-ENTMCNC: 36.4 GM/DL — HIGH (ref 32–36)
MCHC RBC-ENTMCNC: 38.8 PG — HIGH (ref 27–34)
MCHC RBC-ENTMCNC: 38.8 PG — HIGH (ref 27–34)
MCHC RBC-ENTMCNC: 39.2 PG — HIGH (ref 27–34)
MCHC RBC-ENTMCNC: 39.2 PG — HIGH (ref 27–34)
MCV RBC AUTO: 107.1 FL — HIGH (ref 80–100)
MCV RBC AUTO: 107.1 FL — HIGH (ref 80–100)
MCV RBC AUTO: 107.8 FL — HIGH (ref 80–100)
MCV RBC AUTO: 107.8 FL — HIGH (ref 80–100)
MONOCYTES # BLD AUTO: 0.56 K/UL — SIGNIFICANT CHANGE UP (ref 0–0.9)
MONOCYTES # BLD AUTO: 0.56 K/UL — SIGNIFICANT CHANGE UP (ref 0–0.9)
MONOCYTES # BLD AUTO: 0.71 K/UL — SIGNIFICANT CHANGE UP (ref 0–0.9)
MONOCYTES # BLD AUTO: 0.71 K/UL — SIGNIFICANT CHANGE UP (ref 0–0.9)
MONOCYTES NFR BLD AUTO: 9 % — SIGNIFICANT CHANGE UP (ref 2–14)
MONOCYTES NFR BLD AUTO: 9 % — SIGNIFICANT CHANGE UP (ref 2–14)
MONOCYTES NFR BLD AUTO: 9.6 % — SIGNIFICANT CHANGE UP (ref 2–14)
MONOCYTES NFR BLD AUTO: 9.6 % — SIGNIFICANT CHANGE UP (ref 2–14)
NEUTROPHILS # BLD AUTO: 4.94 K/UL — SIGNIFICANT CHANGE UP (ref 1.8–7.4)
NEUTROPHILS # BLD AUTO: 4.94 K/UL — SIGNIFICANT CHANGE UP (ref 1.8–7.4)
NEUTROPHILS # BLD AUTO: 5.91 K/UL — SIGNIFICANT CHANGE UP (ref 1.8–7.4)
NEUTROPHILS # BLD AUTO: 5.91 K/UL — SIGNIFICANT CHANGE UP (ref 1.8–7.4)
NEUTROPHILS NFR BLD AUTO: 79.1 % — HIGH (ref 43–77)
NEUTROPHILS NFR BLD AUTO: 79.1 % — HIGH (ref 43–77)
NEUTROPHILS NFR BLD AUTO: 79.6 % — HIGH (ref 43–77)
NEUTROPHILS NFR BLD AUTO: 79.6 % — HIGH (ref 43–77)
PHOSPHATE SERPL-MCNC: 2.7 MG/DL — SIGNIFICANT CHANGE UP (ref 2.4–4.7)
PHOSPHATE SERPL-MCNC: 2.7 MG/DL — SIGNIFICANT CHANGE UP (ref 2.4–4.7)
PLATELET # BLD AUTO: 61 K/UL — LOW (ref 150–400)
PLATELET # BLD AUTO: 61 K/UL — LOW (ref 150–400)
PLATELET # BLD AUTO: 72 K/UL — LOW (ref 150–400)
PLATELET # BLD AUTO: 72 K/UL — LOW (ref 150–400)
POTASSIUM SERPL-MCNC: 3.7 MMOL/L — SIGNIFICANT CHANGE UP (ref 3.5–5.3)
POTASSIUM SERPL-MCNC: 3.7 MMOL/L — SIGNIFICANT CHANGE UP (ref 3.5–5.3)
POTASSIUM SERPL-MCNC: 3.8 MMOL/L — SIGNIFICANT CHANGE UP (ref 3.5–5.3)
POTASSIUM SERPL-MCNC: 3.8 MMOL/L — SIGNIFICANT CHANGE UP (ref 3.5–5.3)
POTASSIUM SERPL-SCNC: 3.7 MMOL/L — SIGNIFICANT CHANGE UP (ref 3.5–5.3)
POTASSIUM SERPL-SCNC: 3.7 MMOL/L — SIGNIFICANT CHANGE UP (ref 3.5–5.3)
POTASSIUM SERPL-SCNC: 3.8 MMOL/L — SIGNIFICANT CHANGE UP (ref 3.5–5.3)
POTASSIUM SERPL-SCNC: 3.8 MMOL/L — SIGNIFICANT CHANGE UP (ref 3.5–5.3)
PROT SERPL-MCNC: 5.4 G/DL — LOW (ref 6.6–8.7)
PROT SERPL-MCNC: 5.4 G/DL — LOW (ref 6.6–8.7)
PROT SERPL-MCNC: 5.5 G/DL — LOW (ref 6.6–8.7)
PROT SERPL-MCNC: 5.5 G/DL — LOW (ref 6.6–8.7)
RBC # BLD: 3.25 M/UL — LOW (ref 4.2–5.8)
RBC # BLD: 3.25 M/UL — LOW (ref 4.2–5.8)
RBC # BLD: 3.34 M/UL — LOW (ref 4.2–5.8)
RBC # BLD: 3.34 M/UL — LOW (ref 4.2–5.8)
RBC # FLD: 13.4 % — SIGNIFICANT CHANGE UP (ref 10.3–14.5)
RBC # FLD: 13.4 % — SIGNIFICANT CHANGE UP (ref 10.3–14.5)
RBC # FLD: 13.6 % — SIGNIFICANT CHANGE UP (ref 10.3–14.5)
RBC # FLD: 13.6 % — SIGNIFICANT CHANGE UP (ref 10.3–14.5)
SODIUM SERPL-SCNC: 129 MMOL/L — LOW (ref 135–145)
SODIUM SERPL-SCNC: 129 MMOL/L — LOW (ref 135–145)
SODIUM SERPL-SCNC: 131 MMOL/L — LOW (ref 135–145)
SODIUM SERPL-SCNC: 131 MMOL/L — LOW (ref 135–145)
SPECIMEN SOURCE: SIGNIFICANT CHANGE UP
SPECIMEN SOURCE: SIGNIFICANT CHANGE UP
WBC # BLD: 6.24 K/UL — SIGNIFICANT CHANGE UP (ref 3.8–10.5)
WBC # BLD: 6.24 K/UL — SIGNIFICANT CHANGE UP (ref 3.8–10.5)
WBC # BLD: 7.43 K/UL — SIGNIFICANT CHANGE UP (ref 3.8–10.5)
WBC # BLD: 7.43 K/UL — SIGNIFICANT CHANGE UP (ref 3.8–10.5)
WBC # FLD AUTO: 6.24 K/UL — SIGNIFICANT CHANGE UP (ref 3.8–10.5)
WBC # FLD AUTO: 6.24 K/UL — SIGNIFICANT CHANGE UP (ref 3.8–10.5)
WBC # FLD AUTO: 7.43 K/UL — SIGNIFICANT CHANGE UP (ref 3.8–10.5)
WBC # FLD AUTO: 7.43 K/UL — SIGNIFICANT CHANGE UP (ref 3.8–10.5)

## 2023-11-27 PROCEDURE — 99233 SBSQ HOSP IP/OBS HIGH 50: CPT

## 2023-11-27 PROCEDURE — 76882 US LMTD JT/FCL EVL NVASC XTR: CPT | Mod: 26,RT

## 2023-11-27 RX ORDER — PIPERACILLIN AND TAZOBACTAM 4; .5 G/20ML; G/20ML
3.38 INJECTION, POWDER, LYOPHILIZED, FOR SOLUTION INTRAVENOUS EVERY 8 HOURS
Refills: 0 | Status: DISCONTINUED | OUTPATIENT
Start: 2023-11-27 | End: 2023-11-30

## 2023-11-27 RX ORDER — PIPERACILLIN AND TAZOBACTAM 4; .5 G/20ML; G/20ML
3.38 INJECTION, POWDER, LYOPHILIZED, FOR SOLUTION INTRAVENOUS ONCE
Refills: 0 | Status: COMPLETED | OUTPATIENT
Start: 2023-11-27 | End: 2023-11-27

## 2023-11-27 RX ORDER — VANCOMYCIN HCL 1 G
1000 VIAL (EA) INTRAVENOUS ONCE
Refills: 0 | Status: COMPLETED | OUTPATIENT
Start: 2023-11-27 | End: 2023-11-27

## 2023-11-27 RX ORDER — VANCOMYCIN HCL 1 G
1000 VIAL (EA) INTRAVENOUS EVERY 12 HOURS
Refills: 0 | Status: DISCONTINUED | OUTPATIENT
Start: 2023-11-27 | End: 2023-11-28

## 2023-11-27 RX ORDER — VANCOMYCIN HCL 1 G
1000 VIAL (EA) INTRAVENOUS ONCE
Refills: 0 | Status: DISCONTINUED | OUTPATIENT
Start: 2023-11-27 | End: 2023-11-27

## 2023-11-27 RX ORDER — VANCOMYCIN HCL 1 G
VIAL (EA) INTRAVENOUS
Refills: 0 | Status: DISCONTINUED | OUTPATIENT
Start: 2023-11-27 | End: 2023-11-28

## 2023-11-27 RX ORDER — MAGNESIUM SULFATE 500 MG/ML
2 VIAL (ML) INJECTION ONCE
Refills: 0 | Status: COMPLETED | OUTPATIENT
Start: 2023-11-27 | End: 2023-11-27

## 2023-11-27 RX ADMIN — Medication 1 MILLIGRAM(S): at 17:15

## 2023-11-27 RX ADMIN — Medication 100 MILLIEQUIVALENT(S): at 01:56

## 2023-11-27 RX ADMIN — SODIUM CHLORIDE 75 MILLILITER(S): 9 INJECTION, SOLUTION INTRAVENOUS at 15:08

## 2023-11-27 RX ADMIN — Medication 100 MILLIEQUIVALENT(S): at 04:50

## 2023-11-27 RX ADMIN — Medication 25 GRAM(S): at 14:22

## 2023-11-27 RX ADMIN — Medication 1 PATCH: at 06:27

## 2023-11-27 RX ADMIN — Medication 250 MILLIGRAM(S): at 06:19

## 2023-11-27 RX ADMIN — Medication 105 MILLIGRAM(S): at 22:32

## 2023-11-27 RX ADMIN — Medication 20 MILLIEQUIVALENT(S): at 18:19

## 2023-11-27 RX ADMIN — SODIUM CHLORIDE 75 MILLILITER(S): 9 INJECTION, SOLUTION INTRAVENOUS at 01:06

## 2023-11-27 RX ADMIN — Medication 1 MILLIGRAM(S): at 01:05

## 2023-11-27 RX ADMIN — Medication 105 MILLIGRAM(S): at 15:08

## 2023-11-27 RX ADMIN — PIPERACILLIN AND TAZOBACTAM 25 GRAM(S): 4; .5 INJECTION, POWDER, LYOPHILIZED, FOR SOLUTION INTRAVENOUS at 11:57

## 2023-11-27 RX ADMIN — PIPERACILLIN AND TAZOBACTAM 200 GRAM(S): 4; .5 INJECTION, POWDER, LYOPHILIZED, FOR SOLUTION INTRAVENOUS at 06:19

## 2023-11-27 RX ADMIN — Medication 100 MILLIEQUIVALENT(S): at 00:30

## 2023-11-27 RX ADMIN — Medication 1 PATCH: at 14:22

## 2023-11-27 RX ADMIN — Medication 250 MILLIGRAM(S): at 18:13

## 2023-11-27 RX ADMIN — PIPERACILLIN AND TAZOBACTAM 25 GRAM(S): 4; .5 INJECTION, POWDER, LYOPHILIZED, FOR SOLUTION INTRAVENOUS at 22:35

## 2023-11-27 RX ADMIN — PIPERACILLIN AND TAZOBACTAM 25 GRAM(S): 4; .5 INJECTION, POWDER, LYOPHILIZED, FOR SOLUTION INTRAVENOUS at 15:15

## 2023-11-27 RX ADMIN — Medication 1 PATCH: at 19:15

## 2023-11-27 RX ADMIN — Medication 105 MILLIGRAM(S): at 11:56

## 2023-11-27 RX ADMIN — Medication 1 MILLIGRAM(S): at 06:19

## 2023-11-27 RX ADMIN — Medication 1 PATCH: at 14:30

## 2023-11-27 RX ADMIN — Medication 1 MILLIGRAM(S): at 12:57

## 2023-11-27 NOTE — SWALLOW BEDSIDE ASSESSMENT ADULT - SLP GENERAL OBSERVATIONS
Pt recd a&a, reduced cognition/confused, b/l wrist restraints in place, O2 via NC, SpO2 @ 96%, NAD, 0/10 pain scale pre/post.

## 2023-11-27 NOTE — SWALLOW BEDSIDE ASSESSMENT ADULT - PHARYNGEAL PHASE
Within functional limits increased WOB/Wet vocal quality post oral intake/Cough post oral intake/Multiple swallows

## 2023-11-27 NOTE — SWALLOW BEDSIDE ASSESSMENT ADULT - SLP PERTINENT HISTORY OF CURRENT PROBLEM
As per MD note: "This is a 53 year old male w/ PMHx of polysubstance abuse - alcohol, tobacco and marijuana presents for evaluation of multiple witnessed seizures by family as he was sitting chatting.  He states had previous episode years prior during COVID, and was attributed to infection.  States has 2 beers 4x a week, last drink while at Thanksgiving dinner.   Had 2 episodes of watery, nonbloody diarrhea 1 week prior, self resolved without treatment."

## 2023-11-27 NOTE — PROGRESS NOTE ADULT - ASSESSMENT
53M w/ PMHx of polysubstance abuse - alcohol, tobacco and marijuana presents for evaluation of multiple witnessed seizures by family     #Witnessed seizures  #multiple electrolyte abnormalities w/ hx of alcohol abuse  #Possible beer potomania  #ETOH Abuse  -megaloblastic anemia, likely 2/2 alcohol abuse  -CIWA protocol in [facility]  -11/25, most recent Na, improved to 128  -Ativan taper now q6  -Deferring EEG as patient on benzos  -CT head w/o contrast as above  -IV Thiamine   -IV Mag    #Thrombocytopenia   -platelet count 56 on admission  -will monitor for now    Trend labs, monitor for seizure activity/withdrawal    Dispo: Acute 53M w/ PMHx of polysubstance abuse - alcohol, tobacco and marijuana presents for evaluation of multiple witnessed seizures by family     #Witnessed seizures  #multiple electrolyte abnormalities w/ hx of alcohol abuse  #Possible beer potomania  #ETOH Abuse  -megaloblastic anemia, likely 2/2 alcohol abuse  -CIWA protocol in place  -11/25, most recent Na 131  -Ativan taper now q6  -Deferring EEG as patient on benzos  -CT head w/o contrast as above  -IV Thiamine, IV Mag    #Right UE Abscess  -Antecubital fossa abscess  -purulence appreciated, + erythema, warm to the touch, no fluctuation.  -Potential old IV site, patient unable to give history.    -Started on Zosyn and Vanco empirically  - MRSA swab, blood cultures pending  - RUE US ordered    #Thrombocytopenia   -platelet count 56 on admission  -will monitor for now    #COVID +    Trend labs, monitor for seizure activity/withdrawal     53M w/ PMHx of polysubstance abuse - alcohol, tobacco and marijuana presents for evaluation of multiple witnessed seizures by family     #Witnessed seizures  #multiple electrolyte abnormalities w/ hx of alcohol abuse  #Possible beer potomania  #ETOH Abuse  -megaloblastic anemia, likely 2/2 alcohol abuse  -CIWA protocol in place  -11/25, most recent Na 131  -Ativan taper now q6  -Deferring EEG as patient on benzos  -CT head w/o contrast as above  -IV Thiamine, IV Mag    #Right UE Abscess  -Antecubital fossa abscess  -purulence appreciated by PA overnight, + erythema, warm to the touch, no fluctuation.  -Potential old IV site, patient unable to give history.    -Started on Zosyn and Vanco empirically  - MRSA swab, blood cultures pending  - RUE US pending    #Thrombocytopenia   -platelet count 56 on admission  -will monitor for now    #COVID +    Trend labs, monitor for seizure activity/withdrawal     53M w/ PMHx of polysubstance abuse - alcohol, tobacco and marijuana presents for evaluation of multiple witnessed seizures by family     #Witnessed seizures  #multiple electrolyte abnormalities w/ hx of alcohol abuse  #Possible beer potomania  #ETOH Abuse  -megaloblastic anemia, likely 2/2 alcohol abuse  -CIWA protocol in place  -11/25, most recent Na 131  -Ativan taper now q6  -Deferring EEG as patient on benzos  -CT head w/o contrast as above  -IV Thiamine, IV Mag    #Right UE cellulitis with purulent drainage. possible abscess  -purulence appreciated by PA overnight, + erythema, warm to the touch, no fluctuation.  -Potential old IV site, patient unable to give history.    -Started on Zosyn and Vanco empirically  - MRSA swab, blood cultures pending  - RUE US pending to eval for abscess  bcx ordered    #Thrombocytopenia   -platelet count 56 on admission  -will monitor for now    #COVID +  incidental. symptomatic  monitor    Trend labs, monitor for seizure activity/withdrawal

## 2023-11-27 NOTE — SWALLOW BEDSIDE ASSESSMENT ADULT - SWALLOW EVAL: DIAGNOSIS
Oral phase of swallow assessed to be WFL. Suspect pharyngeal dysphagia w/ thin and mildly thick liquids marked by immediate weak cough and increased WOB. No overt s/s of penetration/aspiration w/ puree, advanced solids or moderately thick liquids.

## 2023-11-27 NOTE — DISCHARGE NOTE NURSING/CASE MANAGEMENT/SOCIAL WORK - PATIENT PORTAL LINK FT
You can access the FollowMyHealth Patient Portal offered by [facility] by registering at the following website: [email or web address]. By joining [email or web address] FollowMyHealth portal, you will also be able to view your health information using other applications (apps) compatible with our system.

## 2023-11-27 NOTE — PROGRESS NOTE ADULT - SUBJECTIVE AND OBJECTIVE BOX
This is a 53 year old male w/ PMHx of polysubstance abuse - alcohol, tobacco and marijuana presents for evaluation of multiple witnessed seizures by family as he was sitting chatting.  He states had previous episode years prior during COVID, and was attributed to infection.  States has 2 beers 4x a week, last drink while at Thanksgiving dinner.   Had 2 episodes of watery, nonbloody diarrhea 1 week prior, self resolved without treatment.    Overnight at 4 am, PA notified of infected wound in right AC. Noted linear macerations above right antecubital fossa, small circular wound draining purulent discharge in RAC. Potential old IV site, patient unable to give history. + erythema, warm to the touch, no fluctuation. Patient started on Zosyn and Vanco empirically, MRSA swab pending. US of RUE ordered to r/o abscess      This morning, patient     ROS:    MEDICATIONS  (STANDING):  dextrose 5% + lactated ringers. 1000 milliLiter(s) (75 mL/Hr) IV Continuous <Continuous>  folic acid 1 milliGRAM(s) Oral daily  LORazepam   Injectable 1 milliGRAM(s) IV Push every 6 hours  multivitamin 1 Tablet(s) Oral daily  nicotine - 21 mG/24Hr(s) Patch 1 Patch Transdermal daily  piperacillin/tazobactam IVPB.- 3.375 Gram(s) IV Intermittent once  piperacillin/tazobactam IVPB.. 3.375 Gram(s) IV Intermittent every 8 hours  potassium chloride    Tablet ER 20 milliEquivalent(s) Oral once  thiamine IVPB 500 milliGRAM(s) IV Intermittent every 8 hours  vancomycin  IVPB      vancomycin  IVPB 1000 milliGRAM(s) IV Intermittent every 12 hours    MEDICATIONS  (PRN):  aluminum hydroxide/magnesium hydroxide/simethicone Suspension 30 milliLiter(s) Oral every 4 hours PRN Dyspepsia  ondansetron Injectable 4 milliGRAM(s) IV Push every 8 hours PRN Nausea and/or Vomiting      Allergies    No Known Allergies    Intolerances          Vital Signs Last 24 Hrs  T(C): 36.8 (27 Nov 2023 04:05), Max: 37.2 (27 Nov 2023 00:22)  T(F): 98.2 (27 Nov 2023 04:05), Max: 98.9 (27 Nov 2023 00:22)  HR: 106 (27 Nov 2023 04:05) (106 - 120)  BP: 149/101 (27 Nov 2023 04:05) (117/82 - 161/94)  BP(mean): --  RR: 18 (27 Nov 2023 04:05) (18 - 18)  SpO2: 94% (27 Nov 2023 04:05) (94% - 96%)    Parameters below as of 27 Nov 2023 04:05  Patient On (Oxygen Delivery Method): room air        PHYSICAL EXAM:      LABS:                        14.4   8.23  )-----------( 59       ( 26 Nov 2023 10:05 )             39.0     11-26    131<L>  |  89<L>  |  9.2  ----------------------------<  77  3.2<L>   |  26.0  |  0.59    Ca    7.9<L>      26 Nov 2023 10:05  Phos  2.2     11-26  Mg     1.6     11-26    TPro  5.3<L>  /  Alb  3.0<L>  /  TBili  2.1<H>  /  DBili  1.2<H>  /  AST  146<H>  /  ALT  74<H>  /  AlkPhos  98  11-26      Urinalysis Basic - ( 26 Nov 2023 10:05 )    Color: x / Appearance: x / SG: x / pH: x  Gluc: 77 mg/dL / Ketone: x  / Bili: x / Urobili: x   Blood: x / Protein: x / Nitrite: x   Leuk Esterase: x / RBC: x / WBC x   Sq Epi: x / Non Sq Epi: x / Bacteria: x      CAPILLARY BLOOD GLUCOSE          RADIOLOGY & ADDITIONAL TESTS:      Imaging Personally Reviewed:  [  ] YES  [  ] NO    Consultant(s) Notes Reviewed:  [  ] YES  [  ] NO    Care Discussed with Consultants/Other Providers [  ] YES  [  ] NO    Plan of Care discussed with Housestaff [ X ]YES [  ] NO This is a 53 year old male w/ PMHx of polysubstance abuse - alcohol, tobacco and marijuana presents for evaluation of multiple witnessed seizures by family as he was sitting chatting.  He states had previous episode years prior during COVID, and was attributed to infection.  States has 2 beers 4x a week, last drink while at Thanksgiving dinner.   Had 2 episodes of watery, nonbloody diarrhea 1 week prior, self resolved without treatment.    Overnight at 4 am, PA notified of infected wound in right AC. Noted linear demacerations above right antecubital fossa, small circular wound draining purulent discharge in RAC. Potential old IV site, patient unable to give history. + erythema, warm to the touch, no fluctuation. Patient started on Zosyn and Vanco empirically, MRSA swab pending. US of RUE ordered to r/o abscess      This morning, patient     ROS:    MEDICATIONS  (STANDING):  dextrose 5% + lactated ringers. 1000 milliLiter(s) (75 mL/Hr) IV Continuous <Continuous>  folic acid 1 milliGRAM(s) Oral daily  LORazepam   Injectable 1 milliGRAM(s) IV Push every 6 hours  multivitamin 1 Tablet(s) Oral daily  nicotine - 21 mG/24Hr(s) Patch 1 Patch Transdermal daily  piperacillin/tazobactam IVPB.- 3.375 Gram(s) IV Intermittent once  piperacillin/tazobactam IVPB.. 3.375 Gram(s) IV Intermittent every 8 hours  potassium chloride    Tablet ER 20 milliEquivalent(s) Oral once  thiamine IVPB 500 milliGRAM(s) IV Intermittent every 8 hours  vancomycin  IVPB      vancomycin  IVPB 1000 milliGRAM(s) IV Intermittent every 12 hours    MEDICATIONS  (PRN):  aluminum hydroxide/magnesium hydroxide/simethicone Suspension 30 milliLiter(s) Oral every 4 hours PRN Dyspepsia  ondansetron Injectable 4 milliGRAM(s) IV Push every 8 hours PRN Nausea and/or Vomiting      Allergies    No Known Allergies    Intolerances          Vital Signs Last 24 Hrs  T(C): 36.8 (27 Nov 2023 04:05), Max: 37.2 (27 Nov 2023 00:22)  T(F): 98.2 (27 Nov 2023 04:05), Max: 98.9 (27 Nov 2023 00:22)  HR: 106 (27 Nov 2023 04:05) (106 - 120)  BP: 149/101 (27 Nov 2023 04:05) (117/82 - 161/94)  BP(mean): --  RR: 18 (27 Nov 2023 04:05) (18 - 18)  SpO2: 94% (27 Nov 2023 04:05) (94% - 96%)    Parameters below as of 27 Nov 2023 04:05  Patient On (Oxygen Delivery Method): room air        PHYSICAL EXAM:      LABS:                        14.4   8.23  )-----------( 59       ( 26 Nov 2023 10:05 )             39.0     11-26    131<L>  |  89<L>  |  9.2  ----------------------------<  77  3.2<L>   |  26.0  |  0.59    Ca    7.9<L>      26 Nov 2023 10:05  Phos  2.2     11-26  Mg     1.6     11-26    TPro  5.3<L>  /  Alb  3.0<L>  /  TBili  2.1<H>  /  DBili  1.2<H>  /  AST  146<H>  /  ALT  74<H>  /  AlkPhos  98  11-26      Urinalysis Basic - ( 26 Nov 2023 10:05 )    Color: x / Appearance: x / SG: x / pH: x  Gluc: 77 mg/dL / Ketone: x  / Bili: x / Urobili: x   Blood: x / Protein: x / Nitrite: x   Leuk Esterase: x / RBC: x / WBC x   Sq Epi: x / Non Sq Epi: x / Bacteria: x      CAPILLARY BLOOD GLUCOSE          RADIOLOGY & ADDITIONAL TESTS:      Imaging Personally Reviewed:  [  ] YES  [  ] NO    Consultant(s) Notes Reviewed:  [  ] YES  [  ] NO    Care Discussed with Consultants/Other Providers [  ] YES  [  ] NO    Plan of Care discussed with Housestaff [ X ]YES [  ] NO This is a 53 year old male w/ PMHx of polysubstance abuse - alcohol, tobacco and marijuana presents for evaluation of multiple witnessed seizures by family as he was sitting chatting.  He states had previous episode years prior during COVID, and was attributed to infection.  States has 2 beers 4x a week, last drink while at Thanksgiving dinner.   Had 2 episodes of watery, nonbloody diarrhea 1 week prior, self resolved without treatment.    Overnight at 4 am, PA notified of infected wound in right AC. Noted linear demacerations above right antecubital fossa, small circular wound draining purulent discharge in RAC. Potential old IV site, patient unable to give history. + erythema, warm to the touch, no fluctuation. Patient started on Zosyn and Vanco empirically, MRSA swab pending. US of RUE ordered.      This morning, patient resting in bed.     ROS:  CONSTITUTIONAL: Denies fever, chills, fatigue  HEENT: Denies acute changes in vision and hearing  CARDIO: Denies CP, SOB, palpitations  PULM: Denies cough, wheezing, SOB  ABD: Denies abd pain, n/v/d/c  : Denies dysuria, urinary frequency  NEURO: Denies HA, numbness/tingling  EXTREMITIES: Denies LE swelling, calf pain      MEDICATIONS  (STANDING):  dextrose 5% + lactated ringers. 1000 milliLiter(s) (75 mL/Hr) IV Continuous <Continuous>  folic acid 1 milliGRAM(s) Oral daily  LORazepam   Injectable 1 milliGRAM(s) IV Push every 6 hours  multivitamin 1 Tablet(s) Oral daily  nicotine - 21 mG/24Hr(s) Patch 1 Patch Transdermal daily  piperacillin/tazobactam IVPB.- 3.375 Gram(s) IV Intermittent once  piperacillin/tazobactam IVPB.. 3.375 Gram(s) IV Intermittent every 8 hours  potassium chloride    Tablet ER 20 milliEquivalent(s) Oral once  thiamine IVPB 500 milliGRAM(s) IV Intermittent every 8 hours  vancomycin  IVPB      vancomycin  IVPB 1000 milliGRAM(s) IV Intermittent every 12 hours    MEDICATIONS  (PRN):  aluminum hydroxide/magnesium hydroxide/simethicone Suspension 30 milliLiter(s) Oral every 4 hours PRN Dyspepsia  ondansetron Injectable 4 milliGRAM(s) IV Push every 8 hours PRN Nausea and/or Vomiting      Allergies    No Known Allergies    Intolerances          Vital Signs Last 24 Hrs  T(C): 36.8 (27 Nov 2023 04:05), Max: 37.2 (27 Nov 2023 00:22)  T(F): 98.2 (27 Nov 2023 04:05), Max: 98.9 (27 Nov 2023 00:22)  HR: 106 (27 Nov 2023 04:05) (106 - 120)  BP: 149/101 (27 Nov 2023 04:05) (117/82 - 161/94)  BP(mean): --  RR: 18 (27 Nov 2023 04:05) (18 - 18)  SpO2: 94% (27 Nov 2023 04:05) (94% - 96%)    Parameters below as of 27 Nov 2023 04:05  Patient On (Oxygen Delivery Method): room air        PHYSICAL EXAM:  GENERAL: A&Ox3, no acute distress, comfortably in bed  HEENT:  Atraumatic, normocephalic, non-icteric sclera, no cervical LAD, neck supple, no JVD, thyroid normal  NEURO/PSYCH:  No focal deficits, normal affect, strength 5/5 all 4 extremities  LUNGS: CTAB, no wrr, non-labored breathing  HEART: RRR, no murmur appreciated  ABD: Soft, non-tender, non-distended, no organomegaly, no appreciable masses, +bs all 4 quadrants  EXTREMITIES:  +abscess/linearity in right antecubital fossa. Nontender, no clubbing, cyanosis, or edema  SKIN: +erythema in linear fashion on right antecubital fossa. No purulence appreciated.        LABS:                        14.4   8.23  )-----------( 59       ( 26 Nov 2023 10:05 )             39.0     11-26    131<L>  |  89<L>  |  9.2  ----------------------------<  77  3.2<L>   |  26.0  |  0.59    Ca    7.9<L>      26 Nov 2023 10:05  Phos  2.2     11-26  Mg     1.6     11-26    TPro  5.3<L>  /  Alb  3.0<L>  /  TBili  2.1<H>  /  DBili  1.2<H>  /  AST  146<H>  /  ALT  74<H>  /  AlkPhos  98  11-26      Urinalysis Basic - ( 26 Nov 2023 10:05 )    Color: x / Appearance: x / SG: x / pH: x  Gluc: 77 mg/dL / Ketone: x  / Bili: x / Urobili: x   Blood: x / Protein: x / Nitrite: x   Leuk Esterase: x / RBC: x / WBC x   Sq Epi: x / Non Sq Epi: x / Bacteria: x      CAPILLARY BLOOD GLUCOSE          RADIOLOGY & ADDITIONAL TESTS:      Imaging Personally Reviewed:  [  ] YES  [  ] NO    Consultant(s) Notes Reviewed:  [  ] YES  [  ] NO    Care Discussed with Consultants/Other Providers [  ] YES  [  ] NO    Plan of Care discussed with Housestaff [ X ]YES [  ] NO

## 2023-11-27 NOTE — DISCHARGE NOTE NURSING/CASE MANAGEMENT/SOCIAL WORK - NSTRANSFERBELONGINGSRESP_GEN_A_NUR
Possible ileus/ early bowel obstruction.  Avoid narcotics.  May use short course of IV Toradol and short course of low-dose IV Ativan while oral medications on hold, encourage avoiding narcotic use as outpatient.  GI evaluation baseline IV fluids  NG tube inserted ER, continue bowel rest  
no

## 2023-11-27 NOTE — DISCHARGE NOTE NURSING/CASE MANAGEMENT/SOCIAL WORK - NSDCPEFALRISK_GEN_ALL_CORE
For information on Fall & Injury Prevention, visit: [email or web address] OR  [email or web address] OR  https://www.cdc.gov/steadi/patient.html

## 2023-11-27 NOTE — CHART NOTE - NSCHARTNOTEFT_GEN_A_CORE
Called by RN, patient with infected wound in right AC  Patient seen and evaluated   Linear macerations above antecubital fossa, small circular wound draining pus seen in AC   ?Old IV site, patient unable to give history   + erythema, warm to the touch, no fluctuation Called by RN, patient with infected wound in right AC  Patient seen and evaluated   Linear macerations above antecubital fossa, small circular wound draining pus seen in AC   ?Old IV site, patient unable to give history   + erythema, warm to the touch, no fluctuation  Patient started on Zosyn and Vanco empirically  MRSA swab pending   RN to notify with any acute changes Called by RN, patient with infected wound in right AC  Patient seen and evaluated   Linear macerations above right antecubital fossa, small circular wound draining purulent discharge in RAC   ?Old IV site, patient unable to give history   + erythema, warm to the touch, no fluctuation  Patient started on Zosyn and Vanco empirically  MRSA swab pending   US of RUE ordered to r/o abscess    RN to notify with any acute changes

## 2023-11-28 LAB
ALBUMIN SERPL ELPH-MCNC: 2.7 G/DL — LOW (ref 3.3–5.2)
ALBUMIN SERPL ELPH-MCNC: 2.7 G/DL — LOW (ref 3.3–5.2)
ALP SERPL-CCNC: 82 U/L — SIGNIFICANT CHANGE UP (ref 40–120)
ALP SERPL-CCNC: 82 U/L — SIGNIFICANT CHANGE UP (ref 40–120)
ALT FLD-CCNC: 67 U/L — HIGH
ALT FLD-CCNC: 67 U/L — HIGH
ANION GAP SERPL CALC-SCNC: 9 MMOL/L — SIGNIFICANT CHANGE UP (ref 5–17)
ANION GAP SERPL CALC-SCNC: 9 MMOL/L — SIGNIFICANT CHANGE UP (ref 5–17)
AST SERPL-CCNC: 129 U/L — HIGH
AST SERPL-CCNC: 129 U/L — HIGH
BASOPHILS # BLD AUTO: 0.04 K/UL — SIGNIFICANT CHANGE UP (ref 0–0.2)
BASOPHILS NFR BLD AUTO: 0.8 % — SIGNIFICANT CHANGE UP (ref 0–2)
BILIRUB DIRECT SERPL-MCNC: 1.3 MG/DL — HIGH (ref 0–0.3)
BILIRUB DIRECT SERPL-MCNC: 1.3 MG/DL — HIGH (ref 0–0.3)
BILIRUB INDIRECT FLD-MCNC: 0.9 MG/DL — SIGNIFICANT CHANGE UP (ref 0.2–1)
BILIRUB INDIRECT FLD-MCNC: 0.9 MG/DL — SIGNIFICANT CHANGE UP (ref 0.2–1)
BILIRUB SERPL-MCNC: 2.2 MG/DL — HIGH (ref 0.4–2)
BILIRUB SERPL-MCNC: 2.2 MG/DL — HIGH (ref 0.4–2)
BUN SERPL-MCNC: 9.2 MG/DL — SIGNIFICANT CHANGE UP (ref 8–20)
BUN SERPL-MCNC: 9.2 MG/DL — SIGNIFICANT CHANGE UP (ref 8–20)
CALCIUM SERPL-MCNC: 7.9 MG/DL — LOW (ref 8.4–10.5)
CALCIUM SERPL-MCNC: 7.9 MG/DL — LOW (ref 8.4–10.5)
CHLORIDE SERPL-SCNC: 92 MMOL/L — LOW (ref 96–108)
CHLORIDE SERPL-SCNC: 92 MMOL/L — LOW (ref 96–108)
CO2 SERPL-SCNC: 28 MMOL/L — SIGNIFICANT CHANGE UP (ref 22–29)
CO2 SERPL-SCNC: 28 MMOL/L — SIGNIFICANT CHANGE UP (ref 22–29)
CREAT SERPL-MCNC: 0.51 MG/DL — SIGNIFICANT CHANGE UP (ref 0.5–1.3)
CREAT SERPL-MCNC: 0.51 MG/DL — SIGNIFICANT CHANGE UP (ref 0.5–1.3)
EGFR: 121 ML/MIN/1.73M2 — SIGNIFICANT CHANGE UP
EGFR: 121 ML/MIN/1.73M2 — SIGNIFICANT CHANGE UP
EOSINOPHIL # BLD AUTO: 0.1 K/UL — SIGNIFICANT CHANGE UP (ref 0–0.5)
EOSINOPHIL # BLD AUTO: 0.1 K/UL — SIGNIFICANT CHANGE UP (ref 0–0.5)
EOSINOPHIL # BLD AUTO: 0.12 K/UL — SIGNIFICANT CHANGE UP (ref 0–0.5)
EOSINOPHIL # BLD AUTO: 0.12 K/UL — SIGNIFICANT CHANGE UP (ref 0–0.5)
EOSINOPHIL NFR BLD AUTO: 1.9 % — SIGNIFICANT CHANGE UP (ref 0–6)
EOSINOPHIL NFR BLD AUTO: 1.9 % — SIGNIFICANT CHANGE UP (ref 0–6)
EOSINOPHIL NFR BLD AUTO: 2.3 % — SIGNIFICANT CHANGE UP (ref 0–6)
EOSINOPHIL NFR BLD AUTO: 2.3 % — SIGNIFICANT CHANGE UP (ref 0–6)
GLUCOSE SERPL-MCNC: 103 MG/DL — HIGH (ref 70–99)
GLUCOSE SERPL-MCNC: 103 MG/DL — HIGH (ref 70–99)
GRAM STN FLD: ABNORMAL
GRAM STN FLD: ABNORMAL
HCT VFR BLD CALC: 33.4 % — LOW (ref 39–50)
HCT VFR BLD CALC: 33.4 % — LOW (ref 39–50)
HCT VFR BLD CALC: 34.8 % — LOW (ref 39–50)
HCT VFR BLD CALC: 34.8 % — LOW (ref 39–50)
HGB BLD-MCNC: 11.9 G/DL — LOW (ref 13–17)
HGB BLD-MCNC: 11.9 G/DL — LOW (ref 13–17)
HGB BLD-MCNC: 12.6 G/DL — LOW (ref 13–17)
HGB BLD-MCNC: 12.6 G/DL — LOW (ref 13–17)
IMM GRANULOCYTES NFR BLD AUTO: 0.6 % — SIGNIFICANT CHANGE UP (ref 0–0.9)
IMM GRANULOCYTES NFR BLD AUTO: 0.6 % — SIGNIFICANT CHANGE UP (ref 0–0.9)
IMM GRANULOCYTES NFR BLD AUTO: 0.9 % — SIGNIFICANT CHANGE UP (ref 0–0.9)
IMM GRANULOCYTES NFR BLD AUTO: 0.9 % — SIGNIFICANT CHANGE UP (ref 0–0.9)
LYMPHOCYTES # BLD AUTO: 0.63 K/UL — LOW (ref 1–3.3)
LYMPHOCYTES # BLD AUTO: 0.63 K/UL — LOW (ref 1–3.3)
LYMPHOCYTES # BLD AUTO: 0.69 K/UL — LOW (ref 1–3.3)
LYMPHOCYTES # BLD AUTO: 0.69 K/UL — LOW (ref 1–3.3)
LYMPHOCYTES # BLD AUTO: 12 % — LOW (ref 13–44)
LYMPHOCYTES # BLD AUTO: 12 % — LOW (ref 13–44)
LYMPHOCYTES # BLD AUTO: 13.2 % — SIGNIFICANT CHANGE UP (ref 13–44)
LYMPHOCYTES # BLD AUTO: 13.2 % — SIGNIFICANT CHANGE UP (ref 13–44)
MAGNESIUM SERPL-MCNC: 1.8 MG/DL — SIGNIFICANT CHANGE UP (ref 1.6–2.6)
MAGNESIUM SERPL-MCNC: 1.8 MG/DL — SIGNIFICANT CHANGE UP (ref 1.6–2.6)
MCHC RBC-ENTMCNC: 35.6 GM/DL — SIGNIFICANT CHANGE UP (ref 32–36)
MCHC RBC-ENTMCNC: 35.6 GM/DL — SIGNIFICANT CHANGE UP (ref 32–36)
MCHC RBC-ENTMCNC: 36.2 GM/DL — HIGH (ref 32–36)
MCHC RBC-ENTMCNC: 36.2 GM/DL — HIGH (ref 32–36)
MCHC RBC-ENTMCNC: 37.9 PG — HIGH (ref 27–34)
MCHC RBC-ENTMCNC: 37.9 PG — HIGH (ref 27–34)
MCHC RBC-ENTMCNC: 38.5 PG — HIGH (ref 27–34)
MCHC RBC-ENTMCNC: 38.5 PG — HIGH (ref 27–34)
MCV RBC AUTO: 106.4 FL — HIGH (ref 80–100)
MONOCYTES # BLD AUTO: 0.66 K/UL — SIGNIFICANT CHANGE UP (ref 0–0.9)
MONOCYTES # BLD AUTO: 0.66 K/UL — SIGNIFICANT CHANGE UP (ref 0–0.9)
MONOCYTES # BLD AUTO: 0.77 K/UL — SIGNIFICANT CHANGE UP (ref 0–0.9)
MONOCYTES # BLD AUTO: 0.77 K/UL — SIGNIFICANT CHANGE UP (ref 0–0.9)
MONOCYTES NFR BLD AUTO: 12.5 % — SIGNIFICANT CHANGE UP (ref 2–14)
MONOCYTES NFR BLD AUTO: 12.5 % — SIGNIFICANT CHANGE UP (ref 2–14)
MONOCYTES NFR BLD AUTO: 14.8 % — HIGH (ref 2–14)
MONOCYTES NFR BLD AUTO: 14.8 % — HIGH (ref 2–14)
NEUTROPHILS # BLD AUTO: 3.56 K/UL — SIGNIFICANT CHANGE UP (ref 1.8–7.4)
NEUTROPHILS # BLD AUTO: 3.56 K/UL — SIGNIFICANT CHANGE UP (ref 1.8–7.4)
NEUTROPHILS # BLD AUTO: 3.79 K/UL — SIGNIFICANT CHANGE UP (ref 1.8–7.4)
NEUTROPHILS # BLD AUTO: 3.79 K/UL — SIGNIFICANT CHANGE UP (ref 1.8–7.4)
NEUTROPHILS NFR BLD AUTO: 68.3 % — SIGNIFICANT CHANGE UP (ref 43–77)
NEUTROPHILS NFR BLD AUTO: 68.3 % — SIGNIFICANT CHANGE UP (ref 43–77)
NEUTROPHILS NFR BLD AUTO: 71.9 % — SIGNIFICANT CHANGE UP (ref 43–77)
NEUTROPHILS NFR BLD AUTO: 71.9 % — SIGNIFICANT CHANGE UP (ref 43–77)
PLATELET # BLD AUTO: 76 K/UL — LOW (ref 150–400)
PLATELET # BLD AUTO: 76 K/UL — LOW (ref 150–400)
PLATELET # BLD AUTO: 96 K/UL — LOW (ref 150–400)
PLATELET # BLD AUTO: 96 K/UL — LOW (ref 150–400)
POTASSIUM SERPL-MCNC: 3.2 MMOL/L — LOW (ref 3.5–5.3)
POTASSIUM SERPL-MCNC: 3.2 MMOL/L — LOW (ref 3.5–5.3)
POTASSIUM SERPL-SCNC: 3.2 MMOL/L — LOW (ref 3.5–5.3)
POTASSIUM SERPL-SCNC: 3.2 MMOL/L — LOW (ref 3.5–5.3)
PROT SERPL-MCNC: 5 G/DL — LOW (ref 6.6–8.7)
PROT SERPL-MCNC: 5 G/DL — LOW (ref 6.6–8.7)
RBC # BLD: 3.14 M/UL — LOW (ref 4.2–5.8)
RBC # BLD: 3.14 M/UL — LOW (ref 4.2–5.8)
RBC # BLD: 3.27 M/UL — LOW (ref 4.2–5.8)
RBC # BLD: 3.27 M/UL — LOW (ref 4.2–5.8)
RBC # FLD: 13.2 % — SIGNIFICANT CHANGE UP (ref 10.3–14.5)
SODIUM SERPL-SCNC: 129 MMOL/L — LOW (ref 135–145)
SODIUM SERPL-SCNC: 129 MMOL/L — LOW (ref 135–145)
VANCOMYCIN TROUGH SERPL-MCNC: 6.8 UG/ML — LOW (ref 10–20)
VANCOMYCIN TROUGH SERPL-MCNC: 6.8 UG/ML — LOW (ref 10–20)
WBC # BLD: 5.21 K/UL — SIGNIFICANT CHANGE UP (ref 3.8–10.5)
WBC # BLD: 5.21 K/UL — SIGNIFICANT CHANGE UP (ref 3.8–10.5)
WBC # BLD: 5.27 K/UL — SIGNIFICANT CHANGE UP (ref 3.8–10.5)
WBC # BLD: 5.27 K/UL — SIGNIFICANT CHANGE UP (ref 3.8–10.5)
WBC # FLD AUTO: 5.21 K/UL — SIGNIFICANT CHANGE UP (ref 3.8–10.5)
WBC # FLD AUTO: 5.21 K/UL — SIGNIFICANT CHANGE UP (ref 3.8–10.5)
WBC # FLD AUTO: 5.27 K/UL — SIGNIFICANT CHANGE UP (ref 3.8–10.5)
WBC # FLD AUTO: 5.27 K/UL — SIGNIFICANT CHANGE UP (ref 3.8–10.5)

## 2023-11-28 PROCEDURE — 99232 SBSQ HOSP IP/OBS MODERATE 35: CPT

## 2023-11-28 RX ORDER — MAGNESIUM SULFATE 500 MG/ML
2 VIAL (ML) INJECTION ONCE
Refills: 0 | Status: COMPLETED | OUTPATIENT
Start: 2023-11-28 | End: 2023-11-28

## 2023-11-28 RX ORDER — NIFEDIPINE 30 MG
30 TABLET, EXTENDED RELEASE 24 HR ORAL DAILY
Refills: 0 | Status: DISCONTINUED | OUTPATIENT
Start: 2023-11-28 | End: 2023-12-02

## 2023-11-28 RX ORDER — POTASSIUM CHLORIDE 20 MEQ
40 PACKET (EA) ORAL ONCE
Refills: 0 | Status: COMPLETED | OUTPATIENT
Start: 2023-11-28 | End: 2023-11-28

## 2023-11-28 RX ORDER — VANCOMYCIN HCL 1 G
1250 VIAL (EA) INTRAVENOUS EVERY 12 HOURS
Refills: 0 | Status: DISCONTINUED | OUTPATIENT
Start: 2023-11-29 | End: 2023-11-30

## 2023-11-28 RX ADMIN — Medication 250 MILLIGRAM(S): at 17:30

## 2023-11-28 RX ADMIN — Medication 1 MILLIGRAM(S): at 05:48

## 2023-11-28 RX ADMIN — Medication 105 MILLIGRAM(S): at 04:49

## 2023-11-28 RX ADMIN — Medication 1 MILLIGRAM(S): at 13:18

## 2023-11-28 RX ADMIN — PIPERACILLIN AND TAZOBACTAM 25 GRAM(S): 4; .5 INJECTION, POWDER, LYOPHILIZED, FOR SOLUTION INTRAVENOUS at 05:51

## 2023-11-28 RX ADMIN — Medication 1 TABLET(S): at 13:18

## 2023-11-28 RX ADMIN — Medication 250 MILLIGRAM(S): at 04:56

## 2023-11-28 RX ADMIN — PIPERACILLIN AND TAZOBACTAM 25 GRAM(S): 4; .5 INJECTION, POWDER, LYOPHILIZED, FOR SOLUTION INTRAVENOUS at 13:22

## 2023-11-28 RX ADMIN — SODIUM CHLORIDE 75 MILLILITER(S): 9 INJECTION, SOLUTION INTRAVENOUS at 23:09

## 2023-11-28 RX ADMIN — PIPERACILLIN AND TAZOBACTAM 25 GRAM(S): 4; .5 INJECTION, POWDER, LYOPHILIZED, FOR SOLUTION INTRAVENOUS at 22:48

## 2023-11-28 RX ADMIN — Medication 1 PATCH: at 13:20

## 2023-11-28 RX ADMIN — Medication 30 MILLIGRAM(S): at 23:09

## 2023-11-28 RX ADMIN — Medication 0.5 MILLIGRAM(S): at 13:21

## 2023-11-28 RX ADMIN — Medication 25 GRAM(S): at 13:26

## 2023-11-28 RX ADMIN — Medication 1 MILLIGRAM(S): at 00:15

## 2023-11-28 RX ADMIN — Medication 40 MILLIEQUIVALENT(S): at 13:24

## 2023-11-28 NOTE — PROGRESS NOTE ADULT - ASSESSMENT
53M w/ PMHx of polysubstance abuse - alcohol, tobacco and marijuana presents for evaluation of multiple witnessed seizures by family     #Witnessed seizures  #multiple electrolyte abnormalities w/ hx of alcohol abuse  #Possible beer potomania  #ETOH Abuse  -megaloblastic anemia, likely 2/2 alcohol abuse  -CIWA protocol in place  -11/25, most recent Na 131  -Ativan taper now q6  -Deferring EEG as patient on benzos  -CT head w/o contrast as above  -IV Thiamine, IV Mag    #Right UE cellulitis with purulent drainage. possible abscess  -purulence appreciated by PA overnight, + erythema, warm to the touch, no fluctuation.  -Potential old IV site, patient unable to give history.    -Started on Zosyn and Vanco empirically  - MRSA swab, blood cultures pending  - RUE US revealed superficial thrombophlebitis of the cephalic vein in the right antecubital fossa.  -bcx ordered    #Thrombocytopenia   -platelet count 56 on admission  -will monitor for now    #COVID +  incidental. symptomatic  monitor    Trend labs, monitor for seizure activity/withdrawal 53M w/ PMHx of polysubstance abuse - alcohol, tobacco and marijuana presents for evaluation of multiple witnessed seizures by family     #Witnessed seizures  #multiple electrolyte abnormalities w/ hx of alcohol abuse  #Possible beer potomania  #ETOH Abuse  -megaloblastic anemia, likely 2/2 alcohol abuse  -CIWA protocol in place  -11/25, most recent Na 129  -Ativan taper now 0.6 mg q6, reduced from 1mg  -Deferring EEG as patient on benzos  -CT head w/o contrast as above  -IV Thiamine, IV Mag  -Elevated bilirubin in setting of alcohol abuse, abd ultrasound ordered    #Right UE cellulitis with purulent drainage  -purulence appreciated by PA overnight, + erythema, warm to the touch, no fluctuation.  -Potential old IV site, patient unable to give history.    -Started on Zosyn and Vanco empirically  -Monitor vanc trough 30 min prior to evening dose, 11/28  - MRSA swab, blood cultures pending  - RUE US revealed superficial thrombophlebitis of the cephalic vein in the right antecubital fossa.  -bcx ordered    #Thrombocytopenia   -platelet count 56 on admission  -will monitor for now    #COVID +  incidental. symptomatic  monitor    Trend labs, monitor for seizure activity/withdrawal

## 2023-11-28 NOTE — PROGRESS NOTE ADULT - SUBJECTIVE AND OBJECTIVE BOX
This is a 53 year old male w/ PMHx of polysubstance abuse - alcohol, tobacco and marijuana presents for evaluation of multiple witnessed seizures by family as he was sitting chatting.  He states had previous episode years prior during COVID, and was attributed to infection.  States has 2 beers 4x a week, last drink while at [place] dinner.   Had 2 episodes of watery, nonbloody diarrhea 1 week prior, self resolved without treatment.    No acute events overnight. Patient resting comfortably in bed.    ROS:    MEDICATIONS  (STANDING):  dextrose 5% + lactated ringers. 1000 milliLiter(s) (75 mL/Hr) IV Continuous <Continuous>  folic acid 1 milliGRAM(s) Oral daily  LORazepam   Injectable 1 milliGRAM(s) IV Push every 6 hours  multivitamin 1 Tablet(s) Oral daily  nicotine - 21 mG/24Hr(s) Patch 1 Patch Transdermal daily  piperacillin/tazobactam IVPB.. 3.375 Gram(s) IV Intermittent every 8 hours  vancomycin  IVPB 1000 milliGRAM(s) IV Intermittent every 12 hours  vancomycin  IVPB        MEDICATIONS  (PRN):  aluminum hydroxide/magnesium hydroxide/simethicone Suspension 30 milliLiter(s) Oral every 4 hours PRN Dyspepsia  LORazepam   Injectable 1 milliGRAM(s) IV Push every 1 hour PRN CIWA-Ar score 8 or greater  ondansetron Injectable 4 milliGRAM(s) IV Push every 8 hours PRN Nausea and/or Vomiting      Allergies    No Known Allergies    Intolerances          Vital Signs Last 24 Hrs  T(C): 36.7 (28 Nov 2023 05:08), Max: 37 (27 Nov 2023 16:55)  T(F): 98.1 (28 Nov 2023 05:08), Max: 98.6 (27 Nov 2023 16:55)  HR: 104 (28 Nov 2023 05:08) (104 - 112)  BP: 146/98 (28 Nov 2023 05:08) (146/98 - 179/98)  BP(mean): --  RR: 18 (28 Nov 2023 05:08) (18 - 18)  SpO2: 94% (28 Nov 2023 05:08) (94% - 98%)    Parameters below as of 28 Nov 2023 05:08  Patient On (Oxygen Delivery Method): room air        PHYSICAL EXAM:      LABS:                        11.9   5.27  )-----------( 76       ( 28 Nov 2023 06:06 )             33.4     11-27    129<L>  |  90<L>  |  11.2  ----------------------------<  85  3.7   |  25.0  |  0.50    Ca    8.0<L>      27 Nov 2023 09:10  Phos  2.7     11-27  Mg     1.5     11-27    TPro  5.4<L>  /  Alb  2.7<L>  /  TBili  2.2<H>  /  DBili  x   /  AST  147<H>  /  ALT  68<H>  /  AlkPhos  99  11-27      Urinalysis Basic - ( 27 Nov 2023 09:10 )    Color: x / Appearance: x / SG: x / pH: x  Gluc: 85 mg/dL / Ketone: x  / Bili: x / Urobili: x   Blood: x / Protein: x / Nitrite: x   Leuk Esterase: x / RBC: x / WBC x   Sq Epi: x / Non Sq Epi: x / Bacteria: x      CAPILLARY BLOOD GLUCOSE          RADIOLOGY & ADDITIONAL TESTS:      Imaging Personally Reviewed:  [  ] YES  [  ] NO    Consultant(s) Notes Reviewed:  [  ] YES  [  ] NO    Care Discussed with Consultants/Other Providers [  ] YES  [  ] NO    Plan of Care discussed with Housestaff [ X ]YES [  ] NO This is a 53 year old male w/ PMHx of polysubstance abuse - alcohol, tobacco and marijuana presents for evaluation of multiple witnessed seizures by family as he was sitting chatting.  He states had previous episode years prior during COVID, and was attributed to infection.  States has 2 beers 4x a week, last drink while at [place] dinner.   Had 2 episodes of watery, nonbloody diarrhea 1 week prior, self resolved without treatment.    No acute events overnight. Patient asleep in bed, head of bed not elevated, snoring loudly.    ROS:    MEDICATIONS  (STANDING):  dextrose 5% + lactated ringers. 1000 milliLiter(s) (75 mL/Hr) IV Continuous <Continuous>  folic acid 1 milliGRAM(s) Oral daily  LORazepam   Injectable 1 milliGRAM(s) IV Push every 6 hours  multivitamin 1 Tablet(s) Oral daily  nicotine - 21 mG/24Hr(s) Patch 1 Patch Transdermal daily  piperacillin/tazobactam IVPB.. 3.375 Gram(s) IV Intermittent every 8 hours  vancomycin  IVPB 1000 milliGRAM(s) IV Intermittent every 12 hours  vancomycin  IVPB        MEDICATIONS  (PRN):  aluminum hydroxide/magnesium hydroxide/simethicone Suspension 30 milliLiter(s) Oral every 4 hours PRN Dyspepsia  LORazepam   Injectable 1 milliGRAM(s) IV Push every 1 hour PRN CIWA-Ar score 8 or greater  ondansetron Injectable 4 milliGRAM(s) IV Push every 8 hours PRN Nausea and/or Vomiting      Allergies    No Known Allergies    Intolerances          Vital Signs Last 24 Hrs  T(C): 36.7 (28 Nov 2023 05:08), Max: 37 (27 Nov 2023 16:55)  T(F): 98.1 (28 Nov 2023 05:08), Max: 98.6 (27 Nov 2023 16:55)  HR: 104 (28 Nov 2023 05:08) (104 - 112)  BP: 146/98 (28 Nov 2023 05:08) (146/98 - 179/98)  BP(mean): --  RR: 18 (28 Nov 2023 05:08) (18 - 18)  SpO2: 94% (28 Nov 2023 05:08) (94% - 98%)    Parameters below as of 28 Nov 2023 05:08  Patient On (Oxygen Delivery Method): room air        PHYSICAL EXAM:      LABS:                        11.9   5.27  )-----------( 76       ( 28 Nov 2023 06:06 )             33.4     11-27    129<L>  |  90<L>  |  11.2  ----------------------------<  85  3.7   |  25.0  |  0.50    Ca    8.0<L>      27 Nov 2023 09:10  Phos  2.7     11-27  Mg     1.5     11-27    TPro  5.4<L>  /  Alb  2.7<L>  /  TBili  2.2<H>  /  DBili  x   /  AST  147<H>  /  ALT  68<H>  /  AlkPhos  99  11-27      Urinalysis Basic - ( 27 Nov 2023 09:10 )    Color: x / Appearance: x / SG: x / pH: x  Gluc: 85 mg/dL / Ketone: x  / Bili: x / Urobili: x   Blood: x / Protein: x / Nitrite: x   Leuk Esterase: x / RBC: x / WBC x   Sq Epi: x / Non Sq Epi: x / Bacteria: x      CAPILLARY BLOOD GLUCOSE          RADIOLOGY & ADDITIONAL TESTS:      Imaging Personally Reviewed:  [  ] YES  [  ] NO    Consultant(s) Notes Reviewed:  [  ] YES  [  ] NO    Care Discussed with Consultants/Other Providers [  ] YES  [  ] NO    Plan of Care discussed with Housestaff [ X ]YES [  ] NO This is a 53 year old male w/ PMHx of polysubstance abuse - alcohol, tobacco and marijuana presents for evaluation of multiple witnessed seizures by family as he was sitting chatting.  He states had previous episode years prior during COVID, and was attributed to infection.  States has 2 beers 4x a week, last drink while at [place] dinner.   Had 2 episodes of watery, nonbloody diarrhea 1 week prior, self resolved without treatment.    No acute events overnight. Patient asleep in bed, head of bed not elevated, snoring loudly.    ROS:  Unable to obtain    MEDICATIONS  (STANDING):  dextrose 5% + lactated ringers. 1000 milliLiter(s) (75 mL/Hr) IV Continuous <Continuous>  folic acid 1 milliGRAM(s) Oral daily  LORazepam   Injectable 1 milliGRAM(s) IV Push every 6 hours  multivitamin 1 Tablet(s) Oral daily  nicotine - 21 mG/24Hr(s) Patch 1 Patch Transdermal daily  piperacillin/tazobactam IVPB.. 3.375 Gram(s) IV Intermittent every 8 hours  vancomycin  IVPB 1000 milliGRAM(s) IV Intermittent every 12 hours  vancomycin  IVPB        MEDICATIONS  (PRN):  aluminum hydroxide/magnesium hydroxide/simethicone Suspension 30 milliLiter(s) Oral every 4 hours PRN Dyspepsia  LORazepam   Injectable 1 milliGRAM(s) IV Push every 1 hour PRN CIWA-Ar score 8 or greater  ondansetron Injectable 4 milliGRAM(s) IV Push every 8 hours PRN Nausea and/or Vomiting      Allergies    No Known Allergies    Intolerances          Vital Signs Last 24 Hrs  T(C): 36.7 (28 Nov 2023 05:08), Max: 37 (27 Nov 2023 16:55)  T(F): 98.1 (28 Nov 2023 05:08), Max: 98.6 (27 Nov 2023 16:55)  HR: 104 (28 Nov 2023 05:08) (104 - 112)  BP: 146/98 (28 Nov 2023 05:08) (146/98 - 179/98)  BP(mean): --  RR: 18 (28 Nov 2023 05:08) (18 - 18)  SpO2: 94% (28 Nov 2023 05:08) (94% - 98%)    Parameters below as of 28 Nov 2023 05:08  Patient On (Oxygen Delivery Method): room air        PHYSICAL EXAM:  GENERAL no acute distress, comfortably in bed  HEENT:  Atraumatic, normocephalic, non-icteric sclera, no cervical LAD, neck supple, no JVD, thyroid normal  NEURO/PSYCH: A&0 x2. In withdrawal, on ativan taper   LUNGS: CTAB, no wrr, non-labored breathing  HEART: RRR, no murmur appreciated  ABD: Soft, non-tender, non-distended, no organomegaly, no appreciable masses, +bs all 4 quadrants  EXTREMITIES:  +abscess/linearity in right antecubital fossa. Nontender, no clubbing, cyanosis, or edema  SKIN: +erythema in linear fashion on right antecubital fossa. No purulence appreciated.        LABS:                        11.9   5.27  )-----------( 76       ( 28 Nov 2023 06:06 )             33.4     11-27    129<L>  |  90<L>  |  11.2  ----------------------------<  85  3.7   |  25.0  |  0.50    Ca    8.0<L>      27 Nov 2023 09:10  Phos  2.7     11-27  Mg     1.5     11-27    TPro  5.4<L>  /  Alb  2.7<L>  /  TBili  2.2<H>  /  DBili  x   /  AST  147<H>  /  ALT  68<H>  /  AlkPhos  99  11-27      Urinalysis Basic - ( 27 Nov 2023 09:10 )    Color: x / Appearance: x / SG: x / pH: x  Gluc: 85 mg/dL / Ketone: x  / Bili: x / Urobili: x   Blood: x / Protein: x / Nitrite: x   Leuk Esterase: x / RBC: x / WBC x   Sq Epi: x / Non Sq Epi: x / Bacteria: x      CAPILLARY BLOOD GLUCOSE          RADIOLOGY & ADDITIONAL TESTS:      Imaging Personally Reviewed:  [  ] YES  [  ] NO    Consultant(s) Notes Reviewed:  [  ] YES  [  ] NO    Care Discussed with Consultants/Other Providers [  ] YES  [  ] NO    Plan of Care discussed with Housestaff [ X ]YES [  ] NO

## 2023-11-29 LAB
-  AMPICILLIN/SULBACTAM: SIGNIFICANT CHANGE UP
-  AMPICILLIN/SULBACTAM: SIGNIFICANT CHANGE UP
-  CEFAZOLIN: SIGNIFICANT CHANGE UP
-  CEFAZOLIN: SIGNIFICANT CHANGE UP
-  CLINDAMYCIN: SIGNIFICANT CHANGE UP
-  CLINDAMYCIN: SIGNIFICANT CHANGE UP
-  ERYTHROMYCIN: SIGNIFICANT CHANGE UP
-  ERYTHROMYCIN: SIGNIFICANT CHANGE UP
-  GENTAMICIN: SIGNIFICANT CHANGE UP
-  GENTAMICIN: SIGNIFICANT CHANGE UP
-  OXACILLIN: SIGNIFICANT CHANGE UP
-  OXACILLIN: SIGNIFICANT CHANGE UP
-  RIFAMPIN: SIGNIFICANT CHANGE UP
-  RIFAMPIN: SIGNIFICANT CHANGE UP
-  TETRACYCLINE: SIGNIFICANT CHANGE UP
-  TETRACYCLINE: SIGNIFICANT CHANGE UP
-  TRIMETHOPRIM/SULFAMETHOXAZOLE: SIGNIFICANT CHANGE UP
-  TRIMETHOPRIM/SULFAMETHOXAZOLE: SIGNIFICANT CHANGE UP
-  VANCOMYCIN: SIGNIFICANT CHANGE UP
-  VANCOMYCIN: SIGNIFICANT CHANGE UP
ALBUMIN SERPL ELPH-MCNC: 2.4 G/DL — LOW (ref 3.3–5.2)
ALBUMIN SERPL ELPH-MCNC: 2.4 G/DL — LOW (ref 3.3–5.2)
ALP SERPL-CCNC: 73 U/L — SIGNIFICANT CHANGE UP (ref 40–120)
ALP SERPL-CCNC: 73 U/L — SIGNIFICANT CHANGE UP (ref 40–120)
ALT FLD-CCNC: 63 U/L — HIGH
ALT FLD-CCNC: 63 U/L — HIGH
ANION GAP SERPL CALC-SCNC: 9 MMOL/L — SIGNIFICANT CHANGE UP (ref 5–17)
ANION GAP SERPL CALC-SCNC: 9 MMOL/L — SIGNIFICANT CHANGE UP (ref 5–17)
APPEARANCE UR: ABNORMAL
APPEARANCE UR: ABNORMAL
AST SERPL-CCNC: 109 U/L — HIGH
AST SERPL-CCNC: 109 U/L — HIGH
BACTERIA # UR AUTO: ABNORMAL /HPF
BACTERIA # UR AUTO: ABNORMAL /HPF
BASOPHILS # BLD AUTO: 0.05 K/UL — SIGNIFICANT CHANGE UP (ref 0–0.2)
BASOPHILS # BLD AUTO: 0.05 K/UL — SIGNIFICANT CHANGE UP (ref 0–0.2)
BASOPHILS NFR BLD AUTO: 1.1 % — SIGNIFICANT CHANGE UP (ref 0–2)
BASOPHILS NFR BLD AUTO: 1.1 % — SIGNIFICANT CHANGE UP (ref 0–2)
BILIRUB DIRECT SERPL-MCNC: 1 MG/DL — HIGH (ref 0–0.3)
BILIRUB DIRECT SERPL-MCNC: 1 MG/DL — HIGH (ref 0–0.3)
BILIRUB INDIRECT FLD-MCNC: 0.8 MG/DL — SIGNIFICANT CHANGE UP (ref 0.2–1)
BILIRUB INDIRECT FLD-MCNC: 0.8 MG/DL — SIGNIFICANT CHANGE UP (ref 0.2–1)
BILIRUB SERPL-MCNC: 1.7 MG/DL — SIGNIFICANT CHANGE UP (ref 0.4–2)
BILIRUB SERPL-MCNC: 1.7 MG/DL — SIGNIFICANT CHANGE UP (ref 0.4–2)
BILIRUB UR-MCNC: ABNORMAL
BILIRUB UR-MCNC: ABNORMAL
BUN SERPL-MCNC: 8.7 MG/DL — SIGNIFICANT CHANGE UP (ref 8–20)
BUN SERPL-MCNC: 8.7 MG/DL — SIGNIFICANT CHANGE UP (ref 8–20)
CALCIUM SERPL-MCNC: 8 MG/DL — LOW (ref 8.4–10.5)
CALCIUM SERPL-MCNC: 8 MG/DL — LOW (ref 8.4–10.5)
CAST: 3 /LPF — SIGNIFICANT CHANGE UP (ref 0–4)
CAST: 3 /LPF — SIGNIFICANT CHANGE UP (ref 0–4)
CHLORIDE SERPL-SCNC: 91 MMOL/L — LOW (ref 96–108)
CHLORIDE SERPL-SCNC: 91 MMOL/L — LOW (ref 96–108)
CK MB CFR SERPL CALC: 2.4 NG/ML — SIGNIFICANT CHANGE UP (ref 0–6.7)
CK MB CFR SERPL CALC: 2.4 NG/ML — SIGNIFICANT CHANGE UP (ref 0–6.7)
CK SERPL-CCNC: 363 U/L — HIGH (ref 30–200)
CK SERPL-CCNC: 363 U/L — HIGH (ref 30–200)
CO2 SERPL-SCNC: 29 MMOL/L — SIGNIFICANT CHANGE UP (ref 22–29)
CO2 SERPL-SCNC: 29 MMOL/L — SIGNIFICANT CHANGE UP (ref 22–29)
COLOR SPEC: ABNORMAL
COLOR SPEC: ABNORMAL
CREAT SERPL-MCNC: 0.5 MG/DL — SIGNIFICANT CHANGE UP (ref 0.5–1.3)
CREAT SERPL-MCNC: 0.5 MG/DL — SIGNIFICANT CHANGE UP (ref 0.5–1.3)
DIFF PNL FLD: NEGATIVE — SIGNIFICANT CHANGE UP
DIFF PNL FLD: NEGATIVE — SIGNIFICANT CHANGE UP
EGFR: 122 ML/MIN/1.73M2 — SIGNIFICANT CHANGE UP
EGFR: 122 ML/MIN/1.73M2 — SIGNIFICANT CHANGE UP
EOSINOPHIL # BLD AUTO: 0.1 K/UL — SIGNIFICANT CHANGE UP (ref 0–0.5)
EOSINOPHIL # BLD AUTO: 0.1 K/UL — SIGNIFICANT CHANGE UP (ref 0–0.5)
EOSINOPHIL NFR BLD AUTO: 2.3 % — SIGNIFICANT CHANGE UP (ref 0–6)
EOSINOPHIL NFR BLD AUTO: 2.3 % — SIGNIFICANT CHANGE UP (ref 0–6)
GLUCOSE SERPL-MCNC: 103 MG/DL — HIGH (ref 70–99)
GLUCOSE SERPL-MCNC: 103 MG/DL — HIGH (ref 70–99)
GLUCOSE UR QL: NEGATIVE MG/DL — SIGNIFICANT CHANGE UP
GLUCOSE UR QL: NEGATIVE MG/DL — SIGNIFICANT CHANGE UP
HCT VFR BLD CALC: 31.1 % — LOW (ref 39–50)
HCT VFR BLD CALC: 31.1 % — LOW (ref 39–50)
HGB BLD-MCNC: 11.4 G/DL — LOW (ref 13–17)
HGB BLD-MCNC: 11.4 G/DL — LOW (ref 13–17)
IMM GRANULOCYTES NFR BLD AUTO: 0.7 % — SIGNIFICANT CHANGE UP (ref 0–0.9)
IMM GRANULOCYTES NFR BLD AUTO: 0.7 % — SIGNIFICANT CHANGE UP (ref 0–0.9)
KETONES UR-MCNC: 15 MG/DL
KETONES UR-MCNC: 15 MG/DL
LEUKOCYTE ESTERASE UR-ACNC: ABNORMAL
LEUKOCYTE ESTERASE UR-ACNC: ABNORMAL
LYMPHOCYTES # BLD AUTO: 0.71 K/UL — LOW (ref 1–3.3)
LYMPHOCYTES # BLD AUTO: 0.71 K/UL — LOW (ref 1–3.3)
LYMPHOCYTES # BLD AUTO: 16.3 % — SIGNIFICANT CHANGE UP (ref 13–44)
LYMPHOCYTES # BLD AUTO: 16.3 % — SIGNIFICANT CHANGE UP (ref 13–44)
MAGNESIUM SERPL-MCNC: 1.8 MG/DL — SIGNIFICANT CHANGE UP (ref 1.6–2.6)
MAGNESIUM SERPL-MCNC: 1.8 MG/DL — SIGNIFICANT CHANGE UP (ref 1.6–2.6)
MCHC RBC-ENTMCNC: 36.7 GM/DL — HIGH (ref 32–36)
MCHC RBC-ENTMCNC: 36.7 GM/DL — HIGH (ref 32–36)
MCHC RBC-ENTMCNC: 38.8 PG — HIGH (ref 27–34)
MCHC RBC-ENTMCNC: 38.8 PG — HIGH (ref 27–34)
MCV RBC AUTO: 105.8 FL — HIGH (ref 80–100)
MCV RBC AUTO: 105.8 FL — HIGH (ref 80–100)
METHOD TYPE: SIGNIFICANT CHANGE UP
METHOD TYPE: SIGNIFICANT CHANGE UP
MONOCYTES # BLD AUTO: 0.64 K/UL — SIGNIFICANT CHANGE UP (ref 0–0.9)
MONOCYTES # BLD AUTO: 0.64 K/UL — SIGNIFICANT CHANGE UP (ref 0–0.9)
MONOCYTES NFR BLD AUTO: 14.7 % — HIGH (ref 2–14)
MONOCYTES NFR BLD AUTO: 14.7 % — HIGH (ref 2–14)
NEUTROPHILS # BLD AUTO: 2.83 K/UL — SIGNIFICANT CHANGE UP (ref 1.8–7.4)
NEUTROPHILS # BLD AUTO: 2.83 K/UL — SIGNIFICANT CHANGE UP (ref 1.8–7.4)
NEUTROPHILS NFR BLD AUTO: 64.9 % — SIGNIFICANT CHANGE UP (ref 43–77)
NEUTROPHILS NFR BLD AUTO: 64.9 % — SIGNIFICANT CHANGE UP (ref 43–77)
NITRITE UR-MCNC: POSITIVE
NITRITE UR-MCNC: POSITIVE
PH UR: 6 — SIGNIFICANT CHANGE UP (ref 5–8)
PH UR: 6 — SIGNIFICANT CHANGE UP (ref 5–8)
PHOSPHATE SERPL-MCNC: 2.6 MG/DL — SIGNIFICANT CHANGE UP (ref 2.4–4.7)
PHOSPHATE SERPL-MCNC: 2.6 MG/DL — SIGNIFICANT CHANGE UP (ref 2.4–4.7)
PLATELET # BLD AUTO: 102 K/UL — LOW (ref 150–400)
PLATELET # BLD AUTO: 102 K/UL — LOW (ref 150–400)
POTASSIUM SERPL-MCNC: 3.3 MMOL/L — LOW (ref 3.5–5.3)
POTASSIUM SERPL-MCNC: 3.3 MMOL/L — LOW (ref 3.5–5.3)
POTASSIUM SERPL-SCNC: 3.3 MMOL/L — LOW (ref 3.5–5.3)
POTASSIUM SERPL-SCNC: 3.3 MMOL/L — LOW (ref 3.5–5.3)
PROT SERPL-MCNC: 4.8 G/DL — LOW (ref 6.6–8.7)
PROT SERPL-MCNC: 4.8 G/DL — LOW (ref 6.6–8.7)
PROT UR-MCNC: 30 MG/DL
PROT UR-MCNC: 30 MG/DL
RBC # BLD: 2.94 M/UL — LOW (ref 4.2–5.8)
RBC # BLD: 2.94 M/UL — LOW (ref 4.2–5.8)
RBC # FLD: 13.2 % — SIGNIFICANT CHANGE UP (ref 10.3–14.5)
RBC # FLD: 13.2 % — SIGNIFICANT CHANGE UP (ref 10.3–14.5)
RBC CASTS # UR COMP ASSIST: 2 /HPF — SIGNIFICANT CHANGE UP (ref 0–4)
RBC CASTS # UR COMP ASSIST: 2 /HPF — SIGNIFICANT CHANGE UP (ref 0–4)
SODIUM SERPL-SCNC: 129 MMOL/L — LOW (ref 135–145)
SODIUM SERPL-SCNC: 129 MMOL/L — LOW (ref 135–145)
SP GR SPEC: >1.03 — HIGH (ref 1–1.03)
SP GR SPEC: >1.03 — HIGH (ref 1–1.03)
SQUAMOUS # UR AUTO: 13 /HPF — HIGH (ref 0–5)
SQUAMOUS # UR AUTO: 13 /HPF — HIGH (ref 0–5)
UROBILINOGEN FLD QL: 4 MG/DL (ref 0.2–1)
UROBILINOGEN FLD QL: 4 MG/DL (ref 0.2–1)
WBC # BLD: 4.36 K/UL — SIGNIFICANT CHANGE UP (ref 3.8–10.5)
WBC # BLD: 4.36 K/UL — SIGNIFICANT CHANGE UP (ref 3.8–10.5)
WBC # FLD AUTO: 4.36 K/UL — SIGNIFICANT CHANGE UP (ref 3.8–10.5)
WBC # FLD AUTO: 4.36 K/UL — SIGNIFICANT CHANGE UP (ref 3.8–10.5)
WBC UR QL: 4 /HPF — SIGNIFICANT CHANGE UP (ref 0–5)
WBC UR QL: 4 /HPF — SIGNIFICANT CHANGE UP (ref 0–5)

## 2023-11-29 PROCEDURE — 76705 ECHO EXAM OF ABDOMEN: CPT | Mod: 26

## 2023-11-29 PROCEDURE — 99233 SBSQ HOSP IP/OBS HIGH 50: CPT

## 2023-11-29 RX ORDER — SODIUM CHLORIDE 9 MG/ML
1000 INJECTION, SOLUTION INTRAVENOUS
Refills: 0 | Status: DISCONTINUED | OUTPATIENT
Start: 2023-11-29 | End: 2023-12-01

## 2023-11-29 RX ORDER — POTASSIUM CHLORIDE 20 MEQ
40 PACKET (EA) ORAL ONCE
Refills: 0 | Status: COMPLETED | OUTPATIENT
Start: 2023-11-29 | End: 2023-11-29

## 2023-11-29 RX ADMIN — Medication 0.5 MILLIGRAM(S): at 05:18

## 2023-11-29 RX ADMIN — SODIUM CHLORIDE 125 MILLILITER(S): 9 INJECTION, SOLUTION INTRAVENOUS at 12:12

## 2023-11-29 RX ADMIN — Medication 40 MILLIEQUIVALENT(S): at 12:05

## 2023-11-29 RX ADMIN — Medication 30 MILLIGRAM(S): at 05:18

## 2023-11-29 RX ADMIN — Medication 166.67 MILLIGRAM(S): at 17:22

## 2023-11-29 RX ADMIN — Medication 1 PATCH: at 12:06

## 2023-11-29 RX ADMIN — Medication 1 PATCH: at 12:14

## 2023-11-29 RX ADMIN — PIPERACILLIN AND TAZOBACTAM 25 GRAM(S): 4; .5 INJECTION, POWDER, LYOPHILIZED, FOR SOLUTION INTRAVENOUS at 22:43

## 2023-11-29 RX ADMIN — Medication 0.25 MILLIGRAM(S): at 17:22

## 2023-11-29 RX ADMIN — Medication 1 PATCH: at 00:30

## 2023-11-29 RX ADMIN — Medication 1 MILLIGRAM(S): at 12:05

## 2023-11-29 RX ADMIN — Medication 1 TABLET(S): at 12:05

## 2023-11-29 RX ADMIN — SODIUM CHLORIDE 150 MILLILITER(S): 9 INJECTION, SOLUTION INTRAVENOUS at 02:46

## 2023-11-29 RX ADMIN — Medication 0.5 MILLIGRAM(S): at 00:41

## 2023-11-29 RX ADMIN — Medication 0.25 MILLIGRAM(S): at 12:05

## 2023-11-29 RX ADMIN — Medication 1 PATCH: at 19:08

## 2023-11-29 RX ADMIN — PIPERACILLIN AND TAZOBACTAM 25 GRAM(S): 4; .5 INJECTION, POWDER, LYOPHILIZED, FOR SOLUTION INTRAVENOUS at 05:19

## 2023-11-29 RX ADMIN — SODIUM CHLORIDE 125 MILLILITER(S): 9 INJECTION, SOLUTION INTRAVENOUS at 22:44

## 2023-11-29 RX ADMIN — Medication 166.67 MILLIGRAM(S): at 05:19

## 2023-11-29 RX ADMIN — Medication 1 PATCH: at 05:24

## 2023-11-29 RX ADMIN — PIPERACILLIN AND TAZOBACTAM 25 GRAM(S): 4; .5 INJECTION, POWDER, LYOPHILIZED, FOR SOLUTION INTRAVENOUS at 13:25

## 2023-11-29 NOTE — PROGRESS NOTE ADULT - ASSESSMENT
#Witnessed seizures  #multiple electrolyte abnormalities w/ hx of alcohol abuse  #Possible beer potomania  #ETOH Abuse  -megaloblastic anemia, likely 2/2 alcohol abuse  -CIWA protocol in place  -11/25, most recent Na 129  -Ativan taper now 0.6 mg q6, reduced from 1mg  -Deferring EEG as patient on benzos  -CT head w/o contrast as above  -IV Thiamine, IV Mag  -Elevated bilirubin in setting of alcohol abuse, abd ultrasound ordered    #Right UE cellulitis with purulent drainage  -purulence appreciated by PA overnight, + erythema, warm to the touch, no fluctuation.  -Potential old IV site, patient unable to give history.    -Started on Zosyn and Vanco empirically  -11/28 vanc trough 6.8  - MRSA swab, blood cultures pending  - RUE US revealed superficial thrombophlebitis of the cephalic vein in the right antecubital fossa.  -bcx ordered    #Thrombocytopenia   -platelet count 56 on admission  -will monitor for now    #COVID +  incidental. symptomatic  monitor    Trend labs, monitor for seizure activity/withdrawal   #Witnessed seizures  #multiple electrolyte abnormalities w/ hx of alcohol abuse  #Possible beer potomania  #ETOH Abuse  -megaloblastic anemia, likely 2/2 alcohol abuse  -CIWA protocol in place  -Patient agitation improving. Ativan taper now 0.25 mg q6, reduced from 0.5mg  -Deferring EEG as patient on benzos  -CT head w/o contrast as above  -IV Thiamine, IV Mag  -Elevated bilirubin in setting of alcohol abuse, abd ultrasound ordered, pending result  -Urine studies pending  -Fluids changed to D5 1/2NS    #Right UE cellulitis with purulent drainage  -purulence appreciated by PA overnight, + erythema, warm to the touch, no fluctuation.  -Potential old IV site, patient unable to give history.    -Started on Zosyn and Vanco empirically  -11/28 vanc trough 6.8  - MRSA swab, blood cultures pending  - RUE US revealed superficial thrombophlebitis of the cephalic vein in the right antecubital fossa.  -bcx ordered    #Thrombocytopenia   -platelet count 56 on admission  -will monitor for now    #COVID +  incidental. symptomatic  monitor    Trend labs, monitor for seizure activity/withdrawal   53M w/ PMHx of polysubstance abuse - alcohol, tobacco and marijuana presents for evaluation of multiple witnessed seizures by family admitted for etoh withdrawal and started on benzo taper.    #Witnessed seizures  #multiple electrolyte abnormalities w/ hx of alcohol abuse  #Possible beer potomania  #ETOH Abuse  -megaloblastic anemia, likely 2/2 alcohol abuse  -[facility] protocol in place  -Patient agitation improving. Ativan taper now 0.25 mg q6, reduced from 0.5mg  -Deferring EEG as patient on benzos  -CT head w/o contrast as above  -IV Thiamine, IV Mag  -Elevated bilirubin in setting of alcohol abuse, abd ultrasound ordered, pending result  -Urine studies pending  -Fluids changed to D5 1/2NS    #Right UE cellulitis with purulent drainage  -purulence appreciated by PA overnight, + erythema, warm to the touch, no fluctuation.  -Potential old IV site, patient unable to give history.    -Started on Zosyn and Vanco empirically  -11/28 vanc trough 6.8  - MRSA swab, blood cultures pending  - RUE US revealed superficial thrombophlebitis of the cephalic vein in the right antecubital fossa.  -bcx ordered    #Thrombocytopenia   -platelet count 56 on admission  -will monitor for now    #COVID +  incidental. symptomatic  monitor    Trend labs, monitor for seizure activity/withdrawal

## 2023-11-29 NOTE — CHART NOTE - NSCHARTNOTEFT_GEN_A_CORE
Called by RN for dark/bloody urine. Evaluated patient at bedside, no complaints. Saw urine in urinal in bathroom, dark, clear, no sediment.    VITALS:   T(C): 36.7 (11-28-23 @ 22:07), Max: 36.8 (11-28-23 @ 17:00)  HR: 100 (11-28-23 @ 22:07) (96 - 107)  BP: 148/101 (11-28-23 @ 22:07) (145/90 - 162/94)  RR: 18 (11-28-23 @ 22:07) (18 - 20)  SpO2: 96% (11-28-23 @ 22:07) (94% - 98%)    GENERAL: NAD, lying in bed comfortably  HEAD:  Atraumatic, normocephalic  EYES: EOMI, PERRLA, conjunctiva and sclera clear  ENT: Moist mucous membranes  NECK: Supple, no JVD  HEART: Regular rate and rhythm, no murmurs, rubs, or gallops  LUNGS: Unlabored respirations.  Clear to auscultation bilaterally, no crackles, wheezing, or rhonchi  ABDOMEN: Soft, nontender, nondistended, +BS  EXTREMITIES: 2+ peripheral pulses bilaterally. No clubbing, cyanosis, or edema  NERVOUS SYSTEM:  A&Ox3, no focal deficits   SKIN: No rashes or lesions    Plan  Concentrated urine/Rhabdo  - Poor PO intake  - hyponatremic, hypokalemic, hypoCa, HypoPhos on AM labs   - On IV hydration, increased to D5NS 150 ml/hr   - Ordered CK, CMP follow in AM

## 2023-11-29 NOTE — PROGRESS NOTE ADULT - SUBJECTIVE AND OBJECTIVE BOX
53 YOM w/ PMHx of polysubstance abuse - alcohol, tobacco and marijuana presents for evaluation of multiple witnessed seizures by family as he was sitting chatting.  He states had previous episode years prior during COVID, and was attributed to infection.  States has 2 beers 4x a week, last drink while at [place] dinner.   Had 2 episodes of watery, nonbloody diarrhea 1 week prior, self resolved without treatment.    Overnight, patient had dark/bloody urine. Patient was evaluated at bedside, no complaints. Nighttime provider reported poor PO intake. Pt on IV hydration, increased to D5NS 150 ml/hr. Ordered CK, CMP follow in AM.    ROS:    MEDICATIONS  (STANDING):  dextrose 5% + sodium chloride 0.9%. 1000 milliLiter(s) (150 mL/Hr) IV Continuous <Continuous>  folic acid 1 milliGRAM(s) Oral daily  LORazepam   Injectable 0.5 milliGRAM(s) IV Push every 6 hours  multivitamin 1 Tablet(s) Oral daily  nicotine - 21 mG/24Hr(s) Patch 1 Patch Transdermal daily  NIFEdipine XL 30 milliGRAM(s) Oral daily  piperacillin/tazobactam IVPB.. 3.375 Gram(s) IV Intermittent every 8 hours  vancomycin  IVPB 1250 milliGRAM(s) IV Intermittent every 12 hours    MEDICATIONS  (PRN):  aluminum hydroxide/magnesium hydroxide/simethicone Suspension 30 milliLiter(s) Oral every 4 hours PRN Dyspepsia  LORazepam   Injectable 1 milliGRAM(s) IV Push every 1 hour PRN CIWA-Ar score 8 or greater  ondansetron Injectable 4 milliGRAM(s) IV Push every 8 hours PRN Nausea and/or Vomiting      Allergies    No Known Allergies    Intolerances          Vital Signs Last 24 Hrs  T(C): 36.8 (29 Nov 2023 05:11), Max: 36.8 (28 Nov 2023 17:00)  T(F): 98.2 (29 Nov 2023 05:11), Max: 98.3 (28 Nov 2023 17:00)  HR: 104 (29 Nov 2023 05:11) (96 - 107)  BP: 135/89 (29 Nov 2023 05:11) (135/89 - 162/94)  BP(mean): --  RR: 18 (29 Nov 2023 05:11) (18 - 20)  SpO2: 95% (29 Nov 2023 05:11) (95% - 98%)    Parameters below as of 29 Nov 2023 05:11  Patient On (Oxygen Delivery Method): room air        PHYSICAL EXAM:      LABS:                        11.4   4.36  )-----------( 102      ( 29 Nov 2023 05:49 )             31.1     11-29    129<L>  |  91<L>  |  8.7  ----------------------------<  103<H>  3.3<L>   |  29.0  |  0.50    Ca    8.0<L>      29 Nov 2023 05:49  Phos  2.6     11-29  Mg     1.8     11-29    TPro  4.8<L>  /  Alb  2.4<L>  /  TBili  1.7  /  DBili  1.0<H>  /  AST  109<H>  /  ALT  63<H>  /  AlkPhos  73  11-29      Urinalysis Basic - ( 29 Nov 2023 05:49 )    Color: x / Appearance: x / SG: x / pH: x  Gluc: 103 mg/dL / Ketone: x  / Bili: x / Urobili: x   Blood: x / Protein: x / Nitrite: x   Leuk Esterase: x / RBC: x / WBC x   Sq Epi: x / Non Sq Epi: x / Bacteria: x      CAPILLARY BLOOD GLUCOSE          RADIOLOGY & ADDITIONAL TESTS:      Imaging Personally Reviewed:  [  ] YES  [  ] NO    Consultant(s) Notes Reviewed:  [  ] YES  [  ] NO    Care Discussed with Consultants/Other Providers [  ] YES  [  ] NO    Plan of Care discussed with Housestaff [ X ]YES [  ] NO 53 YOM w/ PMHx of polysubstance abuse - alcohol, tobacco and marijuana presents for evaluation of multiple witnessed seizures by family as he was sitting chatting.  He states had previous episode years prior during COVID, and was attributed to infection.  States has 2 beers 4x a week, last drink while at Thanksgiving dinner.   Had 2 episodes of watery, nonbloody diarrhea 1 week prior, self resolved without treatment.    Overnight, patient had dark/bloody urine. Patient was evaluated at bedside, no complaints. Nighttime provider reported poor PO intake. Pt on IV hydration, increased to D5NS 150 ml/hr. Ordered CK, CMP follow in AM.    ROS:  CONSTITUTIONAL: Denies fever, chills, fatigue  HEENT: Denies acute changes in vision and hearing  CARDIO: Denies CP, SOB, palpitations  PULM: Denies cough, wheezing, SOB  ABD: Denies abd pain, n/v/d/c  : Denies dysuria, urinary frequency  NEURO: Denies HA, numbness/tingling  EXTREMITIES: Denies LE swelling, calf pain      MEDICATIONS  (STANDING):  dextrose 5% + sodium chloride 0.9%. 1000 milliLiter(s) (150 mL/Hr) IV Continuous <Continuous>  folic acid 1 milliGRAM(s) Oral daily  LORazepam   Injectable 0.5 milliGRAM(s) IV Push every 6 hours  multivitamin 1 Tablet(s) Oral daily  nicotine - 21 mG/24Hr(s) Patch 1 Patch Transdermal daily  NIFEdipine XL 30 milliGRAM(s) Oral daily  piperacillin/tazobactam IVPB.. 3.375 Gram(s) IV Intermittent every 8 hours  vancomycin  IVPB 1250 milliGRAM(s) IV Intermittent every 12 hours    MEDICATIONS  (PRN):  aluminum hydroxide/magnesium hydroxide/simethicone Suspension 30 milliLiter(s) Oral every 4 hours PRN Dyspepsia  LORazepam   Injectable 1 milliGRAM(s) IV Push every 1 hour PRN CIWA-Ar score 8 or greater  ondansetron Injectable 4 milliGRAM(s) IV Push every 8 hours PRN Nausea and/or Vomiting      Allergies    No Known Allergies    Intolerances          Vital Signs Last 24 Hrs  T(C): 36.8 (29 Nov 2023 05:11), Max: 36.8 (28 Nov 2023 17:00)  T(F): 98.2 (29 Nov 2023 05:11), Max: 98.3 (28 Nov 2023 17:00)  HR: 104 (29 Nov 2023 05:11) (96 - 107)  BP: 135/89 (29 Nov 2023 05:11) (135/89 - 162/94)  BP(mean): --  RR: 18 (29 Nov 2023 05:11) (18 - 20)  SpO2: 95% (29 Nov 2023 05:11) (95% - 98%)    Parameters below as of 29 Nov 2023 05:11  Patient On (Oxygen Delivery Method): room air        PHYSICAL EXAM:  GENERAL: Agitation/tremors improving, no acute distress  HEENT:  Atraumatic, normocephalic, non-icteric sclera, no cervical LAD, neck supple, no JVD, thyroid normal  NEURO/PSYCH:  No focal deficits, affect as expected on ativan   LUNGS: CTAB, no wrr, non-labored breathing  HEART: RRR, no murmur appreciated  ABD: Soft, non-tender, non-distended, no organomegaly, no appreciable masses, +bs all 4 quadrants  EXTREMITIES: +RUE antecubital fossa thrombophlebitis, wrapped. Nontender, no clubbing, cyanosis, or edema  SKIN: +RUE antecubital fossa thrombophlebitis, wrapped.         LABS:                        11.4   4.36  )-----------( 102      ( 29 Nov 2023 05:49 )             31.1     11-29    129<L>  |  91<L>  |  8.7  ----------------------------<  103<H>  3.3<L>   |  29.0  |  0.50    Ca    8.0<L>      29 Nov 2023 05:49  Phos  2.6     11-29  Mg     1.8     11-29    TPro  4.8<L>  /  Alb  2.4<L>  /  TBili  1.7  /  DBili  1.0<H>  /  AST  109<H>  /  ALT  63<H>  /  AlkPhos  73  11-29      Urinalysis Basic - ( 29 Nov 2023 05:49 )    Color: x / Appearance: x / SG: x / pH: x  Gluc: 103 mg/dL / Ketone: x  / Bili: x / Urobili: x   Blood: x / Protein: x / Nitrite: x   Leuk Esterase: x / RBC: x / WBC x   Sq Epi: x / Non Sq Epi: x / Bacteria: x      CAPILLARY BLOOD GLUCOSE          RADIOLOGY & ADDITIONAL TESTS:      Imaging Personally Reviewed:  [  ] YES  [  ] NO    Consultant(s) Notes Reviewed:  [  ] YES  [  ] NO    Care Discussed with Consultants/Other Providers [  ] YES  [  ] NO    Plan of Care discussed with Housestaff [ X ]YES [  ] NO

## 2023-11-30 LAB
ALBUMIN SERPL ELPH-MCNC: 2.7 G/DL — LOW (ref 3.3–5.2)
ALBUMIN SERPL ELPH-MCNC: 2.7 G/DL — LOW (ref 3.3–5.2)
ALP SERPL-CCNC: 67 U/L — SIGNIFICANT CHANGE UP (ref 40–120)
ALP SERPL-CCNC: 67 U/L — SIGNIFICANT CHANGE UP (ref 40–120)
ALT FLD-CCNC: 59 U/L — HIGH
ALT FLD-CCNC: 59 U/L — HIGH
ANION GAP SERPL CALC-SCNC: 6 MMOL/L — SIGNIFICANT CHANGE UP (ref 5–17)
ANION GAP SERPL CALC-SCNC: 6 MMOL/L — SIGNIFICANT CHANGE UP (ref 5–17)
AST SERPL-CCNC: 95 U/L — HIGH
AST SERPL-CCNC: 95 U/L — HIGH
BASOPHILS # BLD AUTO: 0.08 K/UL — SIGNIFICANT CHANGE UP (ref 0–0.2)
BASOPHILS # BLD AUTO: 0.08 K/UL — SIGNIFICANT CHANGE UP (ref 0–0.2)
BASOPHILS NFR BLD AUTO: 1.5 % — SIGNIFICANT CHANGE UP (ref 0–2)
BASOPHILS NFR BLD AUTO: 1.5 % — SIGNIFICANT CHANGE UP (ref 0–2)
BILIRUB DIRECT SERPL-MCNC: 0.6 MG/DL — HIGH (ref 0–0.3)
BILIRUB DIRECT SERPL-MCNC: 0.6 MG/DL — HIGH (ref 0–0.3)
BILIRUB INDIRECT FLD-MCNC: 0.6 MG/DL — SIGNIFICANT CHANGE UP (ref 0.2–1)
BILIRUB INDIRECT FLD-MCNC: 0.6 MG/DL — SIGNIFICANT CHANGE UP (ref 0.2–1)
BILIRUB SERPL-MCNC: 1.2 MG/DL — SIGNIFICANT CHANGE UP (ref 0.4–2)
BILIRUB SERPL-MCNC: 1.2 MG/DL — SIGNIFICANT CHANGE UP (ref 0.4–2)
BUN SERPL-MCNC: 8.4 MG/DL — SIGNIFICANT CHANGE UP (ref 8–20)
BUN SERPL-MCNC: 8.4 MG/DL — SIGNIFICANT CHANGE UP (ref 8–20)
CALCIUM SERPL-MCNC: 7.8 MG/DL — LOW (ref 8.4–10.5)
CALCIUM SERPL-MCNC: 7.8 MG/DL — LOW (ref 8.4–10.5)
CHLORIDE SERPL-SCNC: 96 MMOL/L — SIGNIFICANT CHANGE UP (ref 96–108)
CHLORIDE SERPL-SCNC: 96 MMOL/L — SIGNIFICANT CHANGE UP (ref 96–108)
CO2 SERPL-SCNC: 28 MMOL/L — SIGNIFICANT CHANGE UP (ref 22–29)
CO2 SERPL-SCNC: 28 MMOL/L — SIGNIFICANT CHANGE UP (ref 22–29)
CREAT ?TM UR-MCNC: 91 MG/DL — SIGNIFICANT CHANGE UP
CREAT ?TM UR-MCNC: 91 MG/DL — SIGNIFICANT CHANGE UP
CREAT SERPL-MCNC: 0.59 MG/DL — SIGNIFICANT CHANGE UP (ref 0.5–1.3)
CREAT SERPL-MCNC: 0.59 MG/DL — SIGNIFICANT CHANGE UP (ref 0.5–1.3)
EGFR: 116 ML/MIN/1.73M2 — SIGNIFICANT CHANGE UP
EGFR: 116 ML/MIN/1.73M2 — SIGNIFICANT CHANGE UP
EOSINOPHIL # BLD AUTO: 0.12 K/UL — SIGNIFICANT CHANGE UP (ref 0–0.5)
EOSINOPHIL # BLD AUTO: 0.12 K/UL — SIGNIFICANT CHANGE UP (ref 0–0.5)
EOSINOPHIL NFR BLD AUTO: 2.3 % — SIGNIFICANT CHANGE UP (ref 0–6)
EOSINOPHIL NFR BLD AUTO: 2.3 % — SIGNIFICANT CHANGE UP (ref 0–6)
GLUCOSE SERPL-MCNC: 111 MG/DL — HIGH (ref 70–99)
GLUCOSE SERPL-MCNC: 111 MG/DL — HIGH (ref 70–99)
HCT VFR BLD CALC: 31 % — LOW (ref 39–50)
HCT VFR BLD CALC: 31 % — LOW (ref 39–50)
HGB BLD-MCNC: 11.3 G/DL — LOW (ref 13–17)
HGB BLD-MCNC: 11.3 G/DL — LOW (ref 13–17)
IMM GRANULOCYTES NFR BLD AUTO: 1.9 % — HIGH (ref 0–0.9)
IMM GRANULOCYTES NFR BLD AUTO: 1.9 % — HIGH (ref 0–0.9)
LYMPHOCYTES # BLD AUTO: 0.84 K/UL — LOW (ref 1–3.3)
LYMPHOCYTES # BLD AUTO: 0.84 K/UL — LOW (ref 1–3.3)
LYMPHOCYTES # BLD AUTO: 16.1 % — SIGNIFICANT CHANGE UP (ref 13–44)
LYMPHOCYTES # BLD AUTO: 16.1 % — SIGNIFICANT CHANGE UP (ref 13–44)
MAGNESIUM SERPL-MCNC: 1.6 MG/DL — SIGNIFICANT CHANGE UP (ref 1.6–2.6)
MAGNESIUM SERPL-MCNC: 1.6 MG/DL — SIGNIFICANT CHANGE UP (ref 1.6–2.6)
MCHC RBC-ENTMCNC: 36.5 GM/DL — HIGH (ref 32–36)
MCHC RBC-ENTMCNC: 36.5 GM/DL — HIGH (ref 32–36)
MCHC RBC-ENTMCNC: 38.8 PG — HIGH (ref 27–34)
MCHC RBC-ENTMCNC: 38.8 PG — HIGH (ref 27–34)
MCV RBC AUTO: 106.5 FL — HIGH (ref 80–100)
MCV RBC AUTO: 106.5 FL — HIGH (ref 80–100)
MONOCYTES # BLD AUTO: 0.69 K/UL — SIGNIFICANT CHANGE UP (ref 0–0.9)
MONOCYTES # BLD AUTO: 0.69 K/UL — SIGNIFICANT CHANGE UP (ref 0–0.9)
MONOCYTES NFR BLD AUTO: 13.2 % — SIGNIFICANT CHANGE UP (ref 2–14)
MONOCYTES NFR BLD AUTO: 13.2 % — SIGNIFICANT CHANGE UP (ref 2–14)
MRSA PCR RESULT.: SIGNIFICANT CHANGE UP
MRSA PCR RESULT.: SIGNIFICANT CHANGE UP
NEUTROPHILS # BLD AUTO: 3.39 K/UL — SIGNIFICANT CHANGE UP (ref 1.8–7.4)
NEUTROPHILS # BLD AUTO: 3.39 K/UL — SIGNIFICANT CHANGE UP (ref 1.8–7.4)
NEUTROPHILS NFR BLD AUTO: 65 % — SIGNIFICANT CHANGE UP (ref 43–77)
NEUTROPHILS NFR BLD AUTO: 65 % — SIGNIFICANT CHANGE UP (ref 43–77)
OSMOLALITY UR: 465 MOSM/KG — SIGNIFICANT CHANGE UP (ref 300–1000)
OSMOLALITY UR: 465 MOSM/KG — SIGNIFICANT CHANGE UP (ref 300–1000)
PLATELET # BLD AUTO: 144 K/UL — LOW (ref 150–400)
PLATELET # BLD AUTO: 144 K/UL — LOW (ref 150–400)
POTASSIUM SERPL-MCNC: 3.7 MMOL/L — SIGNIFICANT CHANGE UP (ref 3.5–5.3)
POTASSIUM SERPL-MCNC: 3.7 MMOL/L — SIGNIFICANT CHANGE UP (ref 3.5–5.3)
POTASSIUM SERPL-SCNC: 3.7 MMOL/L — SIGNIFICANT CHANGE UP (ref 3.5–5.3)
POTASSIUM SERPL-SCNC: 3.7 MMOL/L — SIGNIFICANT CHANGE UP (ref 3.5–5.3)
POTASSIUM UR-SCNC: 16 MMOL/L — SIGNIFICANT CHANGE UP
POTASSIUM UR-SCNC: 16 MMOL/L — SIGNIFICANT CHANGE UP
PROT ?TM UR-MCNC: 17 MG/DL — HIGH (ref 0–12)
PROT ?TM UR-MCNC: 17 MG/DL — HIGH (ref 0–12)
PROT SERPL-MCNC: 4.8 G/DL — LOW (ref 6.6–8.7)
PROT SERPL-MCNC: 4.8 G/DL — LOW (ref 6.6–8.7)
PROT/CREAT UR-RTO: 0.2 RATIO — SIGNIFICANT CHANGE UP
PROT/CREAT UR-RTO: 0.2 RATIO — SIGNIFICANT CHANGE UP
RBC # BLD: 2.91 M/UL — LOW (ref 4.2–5.8)
RBC # BLD: 2.91 M/UL — LOW (ref 4.2–5.8)
RBC # FLD: 13 % — SIGNIFICANT CHANGE UP (ref 10.3–14.5)
RBC # FLD: 13 % — SIGNIFICANT CHANGE UP (ref 10.3–14.5)
S AUREUS DNA NOSE QL NAA+PROBE: SIGNIFICANT CHANGE UP
S AUREUS DNA NOSE QL NAA+PROBE: SIGNIFICANT CHANGE UP
SODIUM SERPL-SCNC: 130 MMOL/L — LOW (ref 135–145)
SODIUM SERPL-SCNC: 130 MMOL/L — LOW (ref 135–145)
SODIUM UR-SCNC: 152 MMOL/L — SIGNIFICANT CHANGE UP
SODIUM UR-SCNC: 152 MMOL/L — SIGNIFICANT CHANGE UP
VANCOMYCIN TROUGH SERPL-MCNC: 10.9 UG/ML — SIGNIFICANT CHANGE UP (ref 10–20)
VANCOMYCIN TROUGH SERPL-MCNC: 10.9 UG/ML — SIGNIFICANT CHANGE UP (ref 10–20)
WBC # BLD: 5.22 K/UL — SIGNIFICANT CHANGE UP (ref 3.8–10.5)
WBC # BLD: 5.22 K/UL — SIGNIFICANT CHANGE UP (ref 3.8–10.5)
WBC # FLD AUTO: 5.22 K/UL — SIGNIFICANT CHANGE UP (ref 3.8–10.5)
WBC # FLD AUTO: 5.22 K/UL — SIGNIFICANT CHANGE UP (ref 3.8–10.5)

## 2023-11-30 PROCEDURE — 99233 SBSQ HOSP IP/OBS HIGH 50: CPT

## 2023-11-30 RX ORDER — CEFAZOLIN SODIUM 1 G
1000 VIAL (EA) INJECTION EVERY 8 HOURS
Refills: 0 | Status: DISCONTINUED | OUTPATIENT
Start: 2023-11-30 | End: 2023-12-02

## 2023-11-30 RX ORDER — CEFAZOLIN SODIUM 1 G
1000 VIAL (EA) INJECTION EVERY 8 HOURS
Refills: 0 | Status: DISCONTINUED | OUTPATIENT
Start: 2023-11-30 | End: 2023-11-30

## 2023-11-30 RX ORDER — MAGNESIUM SULFATE 500 MG/ML
2 VIAL (ML) INJECTION ONCE
Refills: 0 | Status: COMPLETED | OUTPATIENT
Start: 2023-11-30 | End: 2023-11-30

## 2023-11-30 RX ADMIN — Medication 0.25 MILLIGRAM(S): at 05:18

## 2023-11-30 RX ADMIN — Medication 0.25 MILLIGRAM(S): at 17:07

## 2023-11-30 RX ADMIN — Medication 166.67 MILLIGRAM(S): at 05:18

## 2023-11-30 RX ADMIN — Medication 25 GRAM(S): at 13:32

## 2023-11-30 RX ADMIN — Medication 30 MILLIGRAM(S): at 05:18

## 2023-11-30 RX ADMIN — SODIUM CHLORIDE 150 MILLILITER(S): 9 INJECTION, SOLUTION INTRAVENOUS at 13:38

## 2023-11-30 RX ADMIN — Medication 1 PATCH: at 20:48

## 2023-11-30 RX ADMIN — Medication 1000 MILLIGRAM(S): at 21:06

## 2023-11-30 RX ADMIN — Medication 0.25 MILLIGRAM(S): at 00:08

## 2023-11-30 RX ADMIN — PIPERACILLIN AND TAZOBACTAM 25 GRAM(S): 4; .5 INJECTION, POWDER, LYOPHILIZED, FOR SOLUTION INTRAVENOUS at 05:19

## 2023-11-30 RX ADMIN — Medication 1 TABLET(S): at 13:31

## 2023-11-30 RX ADMIN — Medication 1 MILLIGRAM(S): at 13:31

## 2023-11-30 RX ADMIN — Medication 1 PATCH: at 13:32

## 2023-11-30 NOTE — PHYSICAL THERAPY INITIAL EVALUATION ADULT - DIAGNOSIS, PT EVAL
Pt demonstrates functional impairments in transfers and ambulation due to decreased balance and impaired coordination.

## 2023-11-30 NOTE — DIETITIAN INITIAL EVALUATION ADULT - PERTINENT MEDS FT
MEDICATIONS  (STANDING):  ceFAZolin   IVPB 1000 milliGRAM(s) IV Intermittent every 8 hours  dextrose 5% + sodium chloride 0.45%. 1000 milliLiter(s) (125 mL/Hr) IV Continuous <Continuous>  dextrose 5% + sodium chloride 0.9%. 1000 milliLiter(s) (150 mL/Hr) IV Continuous <Continuous>  folic acid 1 milliGRAM(s) Oral daily  LORazepam     Tablet 0.25 milliGRAM(s) Oral every 12 hours  multivitamin 1 Tablet(s) Oral daily  nicotine - 21 mG/24Hr(s) Patch 1 Patch Transdermal daily  NIFEdipine XL 30 milliGRAM(s) Oral daily    MEDICATIONS  (PRN):  aluminum hydroxide/magnesium hydroxide/simethicone Suspension 30 milliLiter(s) Oral every 4 hours PRN Dyspepsia  LORazepam   Injectable 1 milliGRAM(s) IV Push every 1 hour PRN CIWA-Ar score 8 or greater  ondansetron Injectable 4 milliGRAM(s) IV Push every 8 hours PRN Nausea and/or Vomiting

## 2023-11-30 NOTE — PHYSICAL THERAPY INITIAL EVALUATION ADULT - CRITERIA FOR SKILLED THERAPEUTIC INTERVENTIONS
Home with assistance for all upright mobility (ambulation, transfers, stairs) and Home PT.  If pts girlfriend/family is unable to provide this level of assistance, pt must DC to Acute Rehab.  Pt also reports he wishes to explore substance abuse rehabilitation programs./impairments found/functional limitations in following categories/anticipated discharge recommendation

## 2023-11-30 NOTE — DIETITIAN INITIAL EVALUATION ADULT - OTHER INFO
53 YOM w/ PMHx of polysubstance abuse - alcohol, tobacco and marijuana presents for evaluation of multiple witnessed seizures by family as he was sitting chatting.  He states had previous episode years prior during COVID, and was attributed to infection.  States has 2 beers 4x a week, last drink while at Thanksgiving dinner.   Had 2 episodes of watery, nonbloody diarrhea 1 week prior, self resolved without treatment.

## 2023-11-30 NOTE — DIETITIAN INITIAL EVALUATION ADULT - ORAL INTAKE PTA/DIET HISTORY
Pt with poor to fair po intake noted on mod thick liquids. Etoh hx noted. Diarrhea PTA now improved.  Pt with poor to fair po intake noted. Etoh hx noted. Diarrhea PTA now improved. Speech tx now recommending thin liquids.

## 2023-11-30 NOTE — PHARMACOTHERAPY INTERVENTION NOTE - COMMENTS
Culture growing MSSA, recommended de-escalation to cefazolin. Discussed with attending, will evaluate cultures and de-escalate.

## 2023-11-30 NOTE — DIETITIAN INITIAL EVALUATION ADULT - ADD RECOMMEND
Continue MVI, folic acid  rec Thiamine  Provide high protein geletein BID Continue MVI, folic acid  rec Thiamine  Provide high protein geletein BID  diet as per ST

## 2023-11-30 NOTE — PROGRESS NOTE ADULT - SUBJECTIVE AND OBJECTIVE BOX
53 YOM w/ PMHx of polysubstance abuse - alcohol, tobacco and marijuana presents for evaluation of multiple witnessed seizures by family as he was sitting chatting.  He states had previous episode years prior during COVID, and was attributed to infection.  States has 2 beers 4x a week, last drink while at Thanksgiving dinner.   Had 2 episodes of watery, nonbloody diarrhea 1 week prior, self resolved without treatment.    No acute events overnight. Patient resting comfortably in bed.    ROS:  Unchanged from previous    MEDICATIONS  (STANDING):  ceFAZolin   IVPB 1000 milliGRAM(s) IV Intermittent every 8 hours  dextrose 5% + sodium chloride 0.45%. 1000 milliLiter(s) (125 mL/Hr) IV Continuous <Continuous>  dextrose 5% + sodium chloride 0.9%. 1000 milliLiter(s) (150 mL/Hr) IV Continuous <Continuous>  folic acid 1 milliGRAM(s) Oral daily  LORazepam     Tablet 0.25 milliGRAM(s) Oral every 12 hours  multivitamin 1 Tablet(s) Oral daily  nicotine - 21 mG/24Hr(s) Patch 1 Patch Transdermal daily  NIFEdipine XL 30 milliGRAM(s) Oral daily    MEDICATIONS  (PRN):  aluminum hydroxide/magnesium hydroxide/simethicone Suspension 30 milliLiter(s) Oral every 4 hours PRN Dyspepsia  LORazepam   Injectable 1 milliGRAM(s) IV Push every 1 hour PRN CIWA-Ar score 8 or greater  ondansetron Injectable 4 milliGRAM(s) IV Push every 8 hours PRN Nausea and/or Vomiting      Allergies    No Known Allergies    Intolerances          Vital Signs Last 24 Hrs  T(C): 36.4 (30 Nov 2023 10:15), Max: 37.1 (29 Nov 2023 16:30)  T(F): 97.5 (30 Nov 2023 10:15), Max: 98.7 (29 Nov 2023 16:30)  HR: 95 (30 Nov 2023 10:15) (95 - 103)  BP: 107/68 (30 Nov 2023 10:15) (103/68 - 123/83)  BP(mean): --  RR: 18 (30 Nov 2023 04:44) (18 - 18)  SpO2: 97% (30 Nov 2023 10:15) (96% - 97%)    Parameters below as of 30 Nov 2023 10:15  Patient On (Oxygen Delivery Method): room air        PHYSICAL EXAM:  GENERAL: No acute distress  HEENT:  Atraumatic, normocephalic, non-icteric sclera, no cervical LAD, neck supple, no JVD, thyroid normal  NEURO/PSYCH:  No focal deficits, affect as expected on ativan   LUNGS: CTAB, no wrr, non-labored breathing  HEART: RRR, no murmur appreciated  ABD: Soft, non-tender, non-distended, no organomegaly, no appreciable masses, +bs all 4 quadrants  EXTREMITIES: +RUE antecubital fossa thrombophlebitis, improving. Nontender, no clubbing, cyanosis, or edema  SKIN: +RUE antecubital fossa thrombophlebitis, improving    LABS:                        11.3   5.22  )-----------( 144      ( 30 Nov 2023 06:30 )             31.0     11-30    130<L>  |  96  |  8.4  ----------------------------<  111<H>  3.7   |  28.0  |  0.59    Ca    7.8<L>      30 Nov 2023 06:30  Phos  2.6     11-29  Mg     1.6     11-30    TPro  4.8<L>  /  Alb  2.7<L>  /  TBili  1.2  /  DBili  0.6<H>  /  AST  95<H>  /  ALT  59<H>  /  AlkPhos  67  11-30      Urinalysis Basic - ( 30 Nov 2023 06:30 )    Color: x / Appearance: x / SG: x / pH: x  Gluc: 111 mg/dL / Ketone: x  / Bili: x / Urobili: x   Blood: x / Protein: x / Nitrite: x   Leuk Esterase: x / RBC: x / WBC x   Sq Epi: x / Non Sq Epi: x / Bacteria: x      CAPILLARY BLOOD GLUCOSE          RADIOLOGY & ADDITIONAL TESTS:      Imaging Personally Reviewed:  [  ] YES  [  ] NO    Consultant(s) Notes Reviewed:  [  ] YES  [  ] NO    Care Discussed with Consultants/Other Providers [  ] YES  [  ] NO    Plan of Care discussed with Housestaff [ X ]YES [  ] NO

## 2023-11-30 NOTE — PROGRESS NOTE ADULT - ASSESSMENT
53M w/ PMHx of polysubstance abuse - alcohol, tobacco and marijuana presents for evaluation of multiple witnessed seizures by family admitted for etoh withdrawal and started on benzo taper.    #Witnessed seizures  #multiple electrolyte abnormalities w/ hx of alcohol abuse  #Possible beer potomania  #ETOH Abuse  -megaloblastic anemia, likely 2/2 alcohol abuse  -[facility] protocol in place  -Patient agitation improving. Ativan taper now 0.25 mg q12, reduced from q6  -Plan to discontinue ativan tomorrow  -Deferring EEG as patient on benzos  -CT head w/o contrast as above  -IV Thiamine, IV Mag  -Elevated bilirubin in setting of alcohol abuse, abd ultrasound ordered, pending result  -Urine studies pending  -Fluids changed to D5 1/2NS    #Right UE cellulitis with purulent drainage  -purulence appreciated by PA overnight, + erythema, warm to the touch, no fluctuation.  -Potential old IV site, patient unable to give history.    -Started on Zosyn and Vanco empirically  -11/28 vanc trough 6.8  - MRSA swab, blood cultures pending  - RUE US revealed superficial thrombophlebitis of the cephalic vein in the right antecubital fossa.  -bcx ordered    #Thrombocytopenia   -platelet count 56 on admission  -will monitor for now    #COVID +  incidental. symptomatic  monitor    Dispo: Trend labs, monitor for seizure activity/withdrawal, d/c ativan if patient continues to improve   53M w/ PMHx of polysubstance abuse - alcohol, tobacco and marijuana presents for evaluation of multiple witnessed seizures by family admitted for etoh withdrawal and started on benzo taper.    #Witnessed seizures  #multiple electrolyte abnormalities w/ hx of alcohol abuse  #Possible beer potomania  #ETOH Abuse  -megaloblastic anemia, likely 2/2 alcohol abuse  -[facility] protocol in place  -Patient agitation improving. Ativan taper now 0.25 mg q12, reduced from q6  -Plan to discontinue ativan tomorrow  -Deferring EEG as patient on benzos  -CT head w/o contrast as above  -IV Thiamine, IV Mag  -Elevated bilirubin in setting of alcohol abuse, abd ultrasound no acute findings. No biliary dilatation. Hepatic steatosis  -11/29 Fluids changed to D5 1/2NS    #Right UE cellulitis with purulent drainage  -purulence appreciated by PA overnight, + erythema, warm to the touch, no fluctuation.  -Potential old IV site, patient unable to give history.    -Started on Zosyn and Vanco empirically  -11/28 vanc trough 6.8  - MRSA swab, blood cultures pending  - RUE US revealed superficial thrombophlebitis of the cephalic vein in the right antecubital fossa.  -bcx ordered    #Thrombocytopenia   -platelet count 56 on admission  -will monitor for now    #COVID +  incidental. symptomatic  monitor    Dispo: Trend labs, monitor for seizure activity/withdrawal, d/c ativan if patient continues to improve

## 2023-11-30 NOTE — PHYSICAL THERAPY INITIAL EVALUATION ADULT - GENERAL OBSERVATIONS, REHAB EVAL
Pt received reclining in bed, bedrails x 4, bed alarm on, CBWR, and IV intact. Pt agreeable to PT session.

## 2023-11-30 NOTE — DIETITIAN INITIAL EVALUATION ADULT - PERTINENT LABORATORY DATA
11-30    130<L>  |  96  |  8.4  ----------------------------<  111<H>  3.7   |  28.0  |  0.59    Ca    7.8<L>      30 Nov 2023 06:30  Phos  2.6     11-29  Mg     1.6     11-30    TPro  4.8<L>  /  Alb  2.7<L>  /  TBili  1.2  /  DBili  0.6<H>  /  AST  95<H>  /  ALT  59<H>  /  AlkPhos  67  11-30

## 2023-12-01 LAB
ALBUMIN SERPL ELPH-MCNC: 2.7 G/DL — LOW (ref 3.3–5.2)
ALBUMIN SERPL ELPH-MCNC: 2.7 G/DL — LOW (ref 3.3–5.2)
ALP SERPL-CCNC: 66 U/L — SIGNIFICANT CHANGE UP (ref 40–120)
ALP SERPL-CCNC: 66 U/L — SIGNIFICANT CHANGE UP (ref 40–120)
ALT FLD-CCNC: 72 U/L — HIGH
ALT FLD-CCNC: 72 U/L — HIGH
ANION GAP SERPL CALC-SCNC: 7 MMOL/L — SIGNIFICANT CHANGE UP (ref 5–17)
ANION GAP SERPL CALC-SCNC: 7 MMOL/L — SIGNIFICANT CHANGE UP (ref 5–17)
ANISOCYTOSIS BLD QL: SIGNIFICANT CHANGE UP
ANISOCYTOSIS BLD QL: SIGNIFICANT CHANGE UP
AST SERPL-CCNC: 126 U/L — HIGH
AST SERPL-CCNC: 126 U/L — HIGH
BASOPHILS # BLD AUTO: 0.11 K/UL — SIGNIFICANT CHANGE UP (ref 0–0.2)
BASOPHILS # BLD AUTO: 0.11 K/UL — SIGNIFICANT CHANGE UP (ref 0–0.2)
BASOPHILS NFR BLD AUTO: 2.6 % — HIGH (ref 0–2)
BASOPHILS NFR BLD AUTO: 2.6 % — HIGH (ref 0–2)
BILIRUB DIRECT SERPL-MCNC: 0.6 MG/DL — HIGH (ref 0–0.3)
BILIRUB DIRECT SERPL-MCNC: 0.6 MG/DL — HIGH (ref 0–0.3)
BILIRUB INDIRECT FLD-MCNC: 0.5 MG/DL — SIGNIFICANT CHANGE UP (ref 0.2–1)
BILIRUB INDIRECT FLD-MCNC: 0.5 MG/DL — SIGNIFICANT CHANGE UP (ref 0.2–1)
BILIRUB SERPL-MCNC: 1 MG/DL — SIGNIFICANT CHANGE UP (ref 0.4–2)
BILIRUB SERPL-MCNC: 1 MG/DL — SIGNIFICANT CHANGE UP (ref 0.4–2)
BUN SERPL-MCNC: 4.8 MG/DL — LOW (ref 8–20)
BUN SERPL-MCNC: 4.8 MG/DL — LOW (ref 8–20)
CALCIUM SERPL-MCNC: 7.9 MG/DL — LOW (ref 8.4–10.5)
CALCIUM SERPL-MCNC: 7.9 MG/DL — LOW (ref 8.4–10.5)
CHLORIDE SERPL-SCNC: 98 MMOL/L — SIGNIFICANT CHANGE UP (ref 96–108)
CHLORIDE SERPL-SCNC: 98 MMOL/L — SIGNIFICANT CHANGE UP (ref 96–108)
CO2 SERPL-SCNC: 26 MMOL/L — SIGNIFICANT CHANGE UP (ref 22–29)
CO2 SERPL-SCNC: 26 MMOL/L — SIGNIFICANT CHANGE UP (ref 22–29)
CREAT SERPL-MCNC: 0.46 MG/DL — LOW (ref 0.5–1.3)
CREAT SERPL-MCNC: 0.46 MG/DL — LOW (ref 0.5–1.3)
EGFR: 125 ML/MIN/1.73M2 — SIGNIFICANT CHANGE UP
EGFR: 125 ML/MIN/1.73M2 — SIGNIFICANT CHANGE UP
ELLIPTOCYTES BLD QL SMEAR: SLIGHT — SIGNIFICANT CHANGE UP
ELLIPTOCYTES BLD QL SMEAR: SLIGHT — SIGNIFICANT CHANGE UP
EOSINOPHIL # BLD AUTO: 0.04 K/UL — SIGNIFICANT CHANGE UP (ref 0–0.5)
EOSINOPHIL # BLD AUTO: 0.04 K/UL — SIGNIFICANT CHANGE UP (ref 0–0.5)
EOSINOPHIL NFR BLD AUTO: 0.9 % — SIGNIFICANT CHANGE UP (ref 0–6)
EOSINOPHIL NFR BLD AUTO: 0.9 % — SIGNIFICANT CHANGE UP (ref 0–6)
GIANT PLATELETS BLD QL SMEAR: PRESENT — SIGNIFICANT CHANGE UP
GIANT PLATELETS BLD QL SMEAR: PRESENT — SIGNIFICANT CHANGE UP
GLUCOSE SERPL-MCNC: 105 MG/DL — HIGH (ref 70–99)
GLUCOSE SERPL-MCNC: 105 MG/DL — HIGH (ref 70–99)
HCT VFR BLD CALC: 28.6 % — LOW (ref 39–50)
HCT VFR BLD CALC: 28.6 % — LOW (ref 39–50)
HGB BLD-MCNC: 10.3 G/DL — LOW (ref 13–17)
HGB BLD-MCNC: 10.3 G/DL — LOW (ref 13–17)
HYPOCHROMIA BLD QL: SLIGHT — SIGNIFICANT CHANGE UP
HYPOCHROMIA BLD QL: SLIGHT — SIGNIFICANT CHANGE UP
LYMPHOCYTES # BLD AUTO: 0.26 K/UL — LOW (ref 1–3.3)
LYMPHOCYTES # BLD AUTO: 0.26 K/UL — LOW (ref 1–3.3)
LYMPHOCYTES # BLD AUTO: 6.1 % — LOW (ref 13–44)
LYMPHOCYTES # BLD AUTO: 6.1 % — LOW (ref 13–44)
MACROCYTES BLD QL: SIGNIFICANT CHANGE UP
MACROCYTES BLD QL: SIGNIFICANT CHANGE UP
MAGNESIUM SERPL-MCNC: 1.8 MG/DL — SIGNIFICANT CHANGE UP (ref 1.6–2.6)
MAGNESIUM SERPL-MCNC: 1.8 MG/DL — SIGNIFICANT CHANGE UP (ref 1.6–2.6)
MANUAL SMEAR VERIFICATION: SIGNIFICANT CHANGE UP
MANUAL SMEAR VERIFICATION: SIGNIFICANT CHANGE UP
MCHC RBC-ENTMCNC: 36 GM/DL — SIGNIFICANT CHANGE UP (ref 32–36)
MCHC RBC-ENTMCNC: 36 GM/DL — SIGNIFICANT CHANGE UP (ref 32–36)
MCHC RBC-ENTMCNC: 38.7 PG — HIGH (ref 27–34)
MCHC RBC-ENTMCNC: 38.7 PG — HIGH (ref 27–34)
MCV RBC AUTO: 107.5 FL — HIGH (ref 80–100)
MCV RBC AUTO: 107.5 FL — HIGH (ref 80–100)
MICROCYTES BLD QL: SLIGHT — SIGNIFICANT CHANGE UP
MICROCYTES BLD QL: SLIGHT — SIGNIFICANT CHANGE UP
MONOCYTES # BLD AUTO: 0.34 K/UL — SIGNIFICANT CHANGE UP (ref 0–0.9)
MONOCYTES # BLD AUTO: 0.34 K/UL — SIGNIFICANT CHANGE UP (ref 0–0.9)
MONOCYTES NFR BLD AUTO: 7.9 % — SIGNIFICANT CHANGE UP (ref 2–14)
MONOCYTES NFR BLD AUTO: 7.9 % — SIGNIFICANT CHANGE UP (ref 2–14)
NEUTROPHILS # BLD AUTO: 3.45 K/UL — SIGNIFICANT CHANGE UP (ref 1.8–7.4)
NEUTROPHILS # BLD AUTO: 3.45 K/UL — SIGNIFICANT CHANGE UP (ref 1.8–7.4)
NEUTROPHILS NFR BLD AUTO: 80.7 % — HIGH (ref 43–77)
NEUTROPHILS NFR BLD AUTO: 80.7 % — HIGH (ref 43–77)
OVALOCYTES BLD QL SMEAR: SLIGHT — SIGNIFICANT CHANGE UP
OVALOCYTES BLD QL SMEAR: SLIGHT — SIGNIFICANT CHANGE UP
PHOSPHATE SERPL-MCNC: 2.9 MG/DL — SIGNIFICANT CHANGE UP (ref 2.4–4.7)
PHOSPHATE SERPL-MCNC: 2.9 MG/DL — SIGNIFICANT CHANGE UP (ref 2.4–4.7)
PLAT MORPH BLD: NORMAL — SIGNIFICANT CHANGE UP
PLAT MORPH BLD: NORMAL — SIGNIFICANT CHANGE UP
PLATELET # BLD AUTO: 159 K/UL — SIGNIFICANT CHANGE UP (ref 150–400)
PLATELET # BLD AUTO: 159 K/UL — SIGNIFICANT CHANGE UP (ref 150–400)
POIKILOCYTOSIS BLD QL AUTO: SLIGHT — SIGNIFICANT CHANGE UP
POIKILOCYTOSIS BLD QL AUTO: SLIGHT — SIGNIFICANT CHANGE UP
POLYCHROMASIA BLD QL SMEAR: SLIGHT — SIGNIFICANT CHANGE UP
POLYCHROMASIA BLD QL SMEAR: SLIGHT — SIGNIFICANT CHANGE UP
POTASSIUM SERPL-MCNC: 3.6 MMOL/L — SIGNIFICANT CHANGE UP (ref 3.5–5.3)
POTASSIUM SERPL-MCNC: 3.6 MMOL/L — SIGNIFICANT CHANGE UP (ref 3.5–5.3)
POTASSIUM SERPL-SCNC: 3.6 MMOL/L — SIGNIFICANT CHANGE UP (ref 3.5–5.3)
POTASSIUM SERPL-SCNC: 3.6 MMOL/L — SIGNIFICANT CHANGE UP (ref 3.5–5.3)
PROT SERPL-MCNC: 4.8 G/DL — LOW (ref 6.6–8.7)
PROT SERPL-MCNC: 4.8 G/DL — LOW (ref 6.6–8.7)
RBC # BLD: 2.66 M/UL — LOW (ref 4.2–5.8)
RBC # BLD: 2.66 M/UL — LOW (ref 4.2–5.8)
RBC # FLD: 13.3 % — SIGNIFICANT CHANGE UP (ref 10.3–14.5)
RBC # FLD: 13.3 % — SIGNIFICANT CHANGE UP (ref 10.3–14.5)
RBC BLD AUTO: ABNORMAL
RBC BLD AUTO: ABNORMAL
SODIUM SERPL-SCNC: 131 MMOL/L — LOW (ref 135–145)
SODIUM SERPL-SCNC: 131 MMOL/L — LOW (ref 135–145)
STOMATOCYTES BLD QL SMEAR: SLIGHT — SIGNIFICANT CHANGE UP
STOMATOCYTES BLD QL SMEAR: SLIGHT — SIGNIFICANT CHANGE UP
TARGETS BLD QL SMEAR: SLIGHT — SIGNIFICANT CHANGE UP
TARGETS BLD QL SMEAR: SLIGHT — SIGNIFICANT CHANGE UP
UUN UR-MCNC: 348 MG/DL — SIGNIFICANT CHANGE UP
UUN UR-MCNC: 348 MG/DL — SIGNIFICANT CHANGE UP
VARIANT LYMPHS # BLD: 1.8 % — SIGNIFICANT CHANGE UP (ref 0–6)
VARIANT LYMPHS # BLD: 1.8 % — SIGNIFICANT CHANGE UP (ref 0–6)
WBC # BLD: 4.27 K/UL — SIGNIFICANT CHANGE UP (ref 3.8–10.5)
WBC # BLD: 4.27 K/UL — SIGNIFICANT CHANGE UP (ref 3.8–10.5)
WBC # FLD AUTO: 4.27 K/UL — SIGNIFICANT CHANGE UP (ref 3.8–10.5)
WBC # FLD AUTO: 4.27 K/UL — SIGNIFICANT CHANGE UP (ref 3.8–10.5)

## 2023-12-01 PROCEDURE — 99239 HOSP IP/OBS DSCHRG MGMT >30: CPT

## 2023-12-01 RX ORDER — CEPHALEXIN 500 MG
1 CAPSULE ORAL
Qty: 20 | Refills: 0
Start: 2023-12-01 | End: 2023-12-05

## 2023-12-01 RX ORDER — HEPARIN SODIUM 5000 [USP'U]/ML
5000 INJECTION INTRAVENOUS; SUBCUTANEOUS EVERY 12 HOURS
Refills: 0 | Status: DISCONTINUED | OUTPATIENT
Start: 2023-12-01 | End: 2023-12-02

## 2023-12-01 RX ORDER — NICOTINE POLACRILEX 2 MG
1 GUM BUCCAL ONCE
Refills: 0 | Status: COMPLETED | OUTPATIENT
Start: 2023-12-01 | End: 2023-12-01

## 2023-12-01 RX ORDER — CEPHALEXIN 500 MG
500 CAPSULE ORAL EVERY 6 HOURS
Refills: 0 | Status: DISCONTINUED | OUTPATIENT
Start: 2023-12-01 | End: 2023-12-02

## 2023-12-01 RX ADMIN — Medication 1 MILLIGRAM(S): at 12:58

## 2023-12-01 RX ADMIN — Medication 1 PATCH: at 17:00

## 2023-12-01 RX ADMIN — Medication 500 MILLIGRAM(S): at 21:21

## 2023-12-01 RX ADMIN — Medication 1000 MILLIGRAM(S): at 05:40

## 2023-12-01 RX ADMIN — Medication 30 MILLIGRAM(S): at 05:40

## 2023-12-01 RX ADMIN — Medication 1 PATCH: at 12:58

## 2023-12-01 RX ADMIN — HEPARIN SODIUM 5000 UNIT(S): 5000 INJECTION INTRAVENOUS; SUBCUTANEOUS at 18:24

## 2023-12-01 RX ADMIN — Medication 1000 MILLIGRAM(S): at 14:41

## 2023-12-01 RX ADMIN — Medication 1 PATCH: at 20:25

## 2023-12-01 RX ADMIN — Medication 0.25 MILLIGRAM(S): at 05:39

## 2023-12-01 RX ADMIN — Medication 1 TABLET(S): at 12:58

## 2023-12-01 RX ADMIN — SODIUM CHLORIDE 150 MILLILITER(S): 9 INJECTION, SOLUTION INTRAVENOUS at 05:42

## 2023-12-01 NOTE — DISCHARGE NOTE PROVIDER - ATTENDING DISCHARGE PHYSICAL EXAMINATION:
VITALS:   T(C): 36.7 (12-01-23 @ 09:27), Max: 37.2 (12-01-23 @ 04:26)  HR: 97 (12-01-23 @ 09:27) (85 - 107)  BP: 138/91 (12-01-23 @ 09:27) (124/83 - 138/91)  RR: 18 (12-01-23 @ 09:27) (18 - 18)  SpO2: 97% (12-01-23 @ 09:27) (95% - 97%)    GENERAL: NAD, lying in bed comfortably  HEAD:  Atraumatic, Normocephalic  EYES: EOMI, PERRLA, conjunctiva and sclera clear  ENT: Moist mucous membranes  NECK: Supple, No JVD  CHEST/LUNG: Clear to auscultation bilaterally; No rales, rhonchi, wheezing, or rubs. Unlabored respirations  HEART: Regular rate and rhythm; No murmurs, rubs, or gallops  ABDOMEN: BSx4; Soft, nontender, nondistended  EXTREMITIES:  2+ Peripheral Pulses, brisk capillary refill. No clubbing, cyanosis, or edema  NERVOUS SYSTEM:  A&Ox3, no focal deficits   SKIN: No rashes or lesions  PSYCH: Normal affect, euthymic mood

## 2023-12-01 NOTE — PROGRESS NOTE ADULT - PROVIDER SPECIALTY LIST ADULT
Internal Medicine
Hospitalist
Internal Medicine

## 2023-12-01 NOTE — DISCHARGE NOTE PROVIDER - NSDCCPCAREPLAN_GEN_ALL_CORE_FT
PRINCIPAL DISCHARGE DIAGNOSIS  Diagnosis: Seizure  Assessment and Plan of Treatment:       SECONDARY DISCHARGE DIAGNOSES  Diagnosis: Alcoholic ketoacidosis  Assessment and Plan of Treatment:

## 2023-12-01 NOTE — DISCHARGE NOTE PROVIDER - HOSPITAL COURSE
53 YOM w/ PMHx of polysubstance abuse - alcohol, tobacco and marijuana presents for evaluation of multiple witnessed seizures by family as he was sitting chatting.  He states had previous episode years prior during COVID, and was attributed to infection.  States has 2 beers 4x a week, last drink while at Thanksgiving dinner.   Had 2 episodes of watery, nonbloody diarrhea 1 week prior, self resolved without treatment. Patient was admitted for ETOH withdrawal and witnessed seizures. Because of the hx of alcohol abuse, patient had multiple electrolyte abnormalities that were addressed and repleated appropriately. Patient was also given IV thiamine and IV mag for possible beer potomania, megaloblastic anemia 2/2 to alcohol abuse noted on labs. CIWA protocol remained in place, patient agitation and symptoms improved as Ativan taper was completed. EEG not performed because patient was on benzos. Elevated bilirubin found in setting of alcohol abuse, abd ultrasound no acute findings. No biliary dilatation. Hepatic steatosis noted. Patient was noted to have purulent drainage at RUE antecubital fossa, potential old IV site. US revealed superficial thrombophlebitis of the cephalic vein in the right antecubital fossa. He was treated with antibiotics, and will be discharged with Keflex 500 qid x 5 days. Thrombocytopenia monitored, remained asymptomatic. Patient was incidentally found to have COVID. Symptoms monitored and improved. Patient has been medically optimized and is stable for discharge.

## 2023-12-01 NOTE — CHART NOTE - NSCHARTNOTEFT_GEN_A_CORE
Problem: Pain  Goal: #Acceptable pain level achieved/maintained at rest using NRS/Faces  This goal is used for patients who can self-report.  Acceptable means the level is at or below the identified comfort/function goal.   Outcome: Outcome Met, Continue evaluating goal progress toward completion  Patient is reporting adequate pain control with PCA    Problem: Pressure Injury, Risk for  Goal: # Skin remains intact  Outcome: Outcome Met, Continue evaluating goal progress toward completion  Aside from documented areas, skin has remained intact. ]      Problem: Activity Intolerance  Goal: # Functional status is maintained or returned to baseline  Outcome: Outcome Not Met, Continue to Monitor  Patient has not been out of bed    Problem: VTE, Risk for  Goal: # No s/s of VTE  Outcome: Outcome Met, Continue evaluating goal progress toward completion  No symptoms of VTE reported.       Problem: At Risk for Falls  Goal: # Patient does not fall  Outcome: Outcome Met, Continue evaluating goal progress toward completion  Patient has not fallen.     Goal: # Takes action to control fall-related risks  Outcome: Outcome Met, Continue evaluating goal progress toward completion  Patient calls appropriately.          Ride service would not take him because he is covid+, tried for an uber but none will take him to [place]. He will stay for tonight and leave in the AM.

## 2023-12-01 NOTE — PROGRESS NOTE ADULT - SUBJECTIVE AND OBJECTIVE BOX
53 YOM w/ PMHx of polysubstance abuse - alcohol, tobacco and marijuana presents for evaluation of multiple witnessed seizures by family as he was sitting chatting.  He states had previous episode years prior during COVID, and was attributed to infection.  States has 2 beers 4x a week, last drink while at Thanksgiving dinner.   Had 2 episodes of watery, nonbloody diarrhea 1 week prior, self resolved without treatment.    No acute events overnight. Patient resting comfortably in bed.      ROS:    MEDICATIONS  (STANDING):  ceFAZolin  Injectable. 1000 milliGRAM(s) IV Push every 8 hours  dextrose 5% + sodium chloride 0.45%. 1000 milliLiter(s) (125 mL/Hr) IV Continuous <Continuous>  dextrose 5% + sodium chloride 0.9%. 1000 milliLiter(s) (150 mL/Hr) IV Continuous <Continuous>  folic acid 1 milliGRAM(s) Oral daily  LORazepam     Tablet 0.25 milliGRAM(s) Oral every 12 hours  multivitamin 1 Tablet(s) Oral daily  nicotine - 21 mG/24Hr(s) Patch 1 Patch Transdermal daily  NIFEdipine XL 30 milliGRAM(s) Oral daily    MEDICATIONS  (PRN):  aluminum hydroxide/magnesium hydroxide/simethicone Suspension 30 milliLiter(s) Oral every 4 hours PRN Dyspepsia  LORazepam   Injectable 1 milliGRAM(s) IV Push every 1 hour PRN CIWA-Ar score 8 or greater  ondansetron Injectable 4 milliGRAM(s) IV Push every 8 hours PRN Nausea and/or Vomiting      Allergies    No Known Allergies    Intolerances          Vital Signs Last 24 Hrs  T(C): 37.2 (01 Dec 2023 04:26), Max: 37.2 (01 Dec 2023 04:26)  T(F): 99 (01 Dec 2023 04:26), Max: 99 (01 Dec 2023 04:26)  HR: 85 (01 Dec 2023 04:26) (85 - 107)  BP: 124/83 (01 Dec 2023 04:26) (107/68 - 133/80)  BP(mean): --  RR: 18 (01 Dec 2023 04:26) (18 - 18)  SpO2: 96% (01 Dec 2023 04:26) (95% - 97%)    Parameters below as of 01 Dec 2023 04:26  Patient On (Oxygen Delivery Method): room air        PHYSICAL EXAM:      LABS:                        11.3   5.22  )-----------( 144      ( 30 Nov 2023 06:30 )             31.0     11-30    130<L>  |  96  |  8.4  ----------------------------<  111<H>  3.7   |  28.0  |  0.59    Ca    7.8<L>      30 Nov 2023 06:30  Mg     1.6     11-30    TPro  4.8<L>  /  Alb  2.7<L>  /  TBili  1.2  /  DBili  0.6<H>  /  AST  95<H>  /  ALT  59<H>  /  AlkPhos  67  11-30      Urinalysis Basic - ( 30 Nov 2023 06:30 )    Color: x / Appearance: x / SG: x / pH: x  Gluc: 111 mg/dL / Ketone: x  / Bili: x / Urobili: x   Blood: x / Protein: x / Nitrite: x   Leuk Esterase: x / RBC: x / WBC x   Sq Epi: x / Non Sq Epi: x / Bacteria: x      CAPILLARY BLOOD GLUCOSE          RADIOLOGY & ADDITIONAL TESTS:      Imaging Personally Reviewed:  [  ] YES  [  ] NO    Consultant(s) Notes Reviewed:  [  ] YES  [  ] NO    Care Discussed with Consultants/Other Providers [  ] YES  [  ] NO    Plan of Care discussed with Housestaff [ X ]YES [  ] NO 53 YOM w/ PMHx of polysubstance abuse - alcohol, tobacco and marijuana presents for evaluation of multiple witnessed seizures by family as he was sitting chatting.  He states had previous episode years prior during COVID, and was attributed to infection.  States has 2 beers 4x a week, last drink while at Thanksgiving dinner.   Had 2 episodes of watery, nonbloody diarrhea 1 week prior, self resolved without treatment.    No acute events overnight. Patient resting comfortably in bed, no tremors or agitation observed.      ROS:  CONSTITUTIONAL: Denies fever, chills, fatigue  HEENT: Denies acute changes in vision and hearing  CARDIO: Denies CP, SOB, palpitations  PULM: Denies cough, wheezing, SOB  ABD: Denies abd pain, n/v/d/c  : Denies dysuria, urinary frequency  NEURO: Denies HA, numbness/tingling  EXTREMITIES: Denies LE swelling, calf pain      MEDICATIONS  (STANDING):  ceFAZolin  Injectable. 1000 milliGRAM(s) IV Push every 8 hours  dextrose 5% + sodium chloride 0.45%. 1000 milliLiter(s) (125 mL/Hr) IV Continuous <Continuous>  dextrose 5% + sodium chloride 0.9%. 1000 milliLiter(s) (150 mL/Hr) IV Continuous <Continuous>  folic acid 1 milliGRAM(s) Oral daily  LORazepam     Tablet 0.25 milliGRAM(s) Oral every 12 hours  multivitamin 1 Tablet(s) Oral daily  nicotine - 21 mG/24Hr(s) Patch 1 Patch Transdermal daily  NIFEdipine XL 30 milliGRAM(s) Oral daily    MEDICATIONS  (PRN):  aluminum hydroxide/magnesium hydroxide/simethicone Suspension 30 milliLiter(s) Oral every 4 hours PRN Dyspepsia  LORazepam   Injectable 1 milliGRAM(s) IV Push every 1 hour PRN CIWA-Ar score 8 or greater  ondansetron Injectable 4 milliGRAM(s) IV Push every 8 hours PRN Nausea and/or Vomiting      Allergies    No Known Allergies    Intolerances          Vital Signs Last 24 Hrs  T(C): 37.2 (01 Dec 2023 04:26), Max: 37.2 (01 Dec 2023 04:26)  T(F): 99 (01 Dec 2023 04:26), Max: 99 (01 Dec 2023 04:26)  HR: 85 (01 Dec 2023 04:26) (85 - 107)  BP: 124/83 (01 Dec 2023 04:26) (107/68 - 133/80)  BP(mean): --  RR: 18 (01 Dec 2023 04:26) (18 - 18)  SpO2: 96% (01 Dec 2023 04:26) (95% - 97%)    Parameters below as of 01 Dec 2023 04:26  Patient On (Oxygen Delivery Method): room air        PHYSICAL EXAM:  GENERAL: No acute distress, comfortably in bed. Fewer tremors and irritability  HEENT:  Atraumatic, normocephalic, non-icteric sclera, no cervical LAD, neck supple, no JVD, thyroid normal  NEURO/PSYCH:  No focal deficits, normal affect, strength 5/5 all 4 extremities  LUNGS: CTAB, no wrr, non-labored breathing  HEART: RRR, no murmur appreciated  ABD: Soft, non-tender, non-distended, no organomegaly, no appreciable masses, +bs all 4 quadrants  EXTREMITIES:  Nontender, no clubbing, cyanosis, or edema  SKIN: Antecubital fossa site improving      LABS:                        11.3   5.22  )-----------( 144      ( 30 Nov 2023 06:30 )             31.0     11-30    130<L>  |  96  |  8.4  ----------------------------<  111<H>  3.7   |  28.0  |  0.59    Ca    7.8<L>      30 Nov 2023 06:30  Mg     1.6     11-30    TPro  4.8<L>  /  Alb  2.7<L>  /  TBili  1.2  /  DBili  0.6<H>  /  AST  95<H>  /  ALT  59<H>  /  AlkPhos  67  11-30      Urinalysis Basic - ( 30 Nov 2023 06:30 )    Color: x / Appearance: x / SG: x / pH: x  Gluc: 111 mg/dL / Ketone: x  / Bili: x / Urobili: x   Blood: x / Protein: x / Nitrite: x   Leuk Esterase: x / RBC: x / WBC x   Sq Epi: x / Non Sq Epi: x / Bacteria: x      CAPILLARY BLOOD GLUCOSE          RADIOLOGY & ADDITIONAL TESTS:      Imaging Personally Reviewed:  [  ] YES  [  ] NO    Consultant(s) Notes Reviewed:  [  ] YES  [  ] NO    Care Discussed with Consultants/Other Providers [  ] YES  [  ] NO    Plan of Care discussed with Housestaff [ X ]YES [  ] NO

## 2023-12-01 NOTE — PROGRESS NOTE ADULT - ASSESSMENT
53M w/ PMHx of polysubstance abuse - alcohol, tobacco and marijuana presents for evaluation of multiple witnessed seizures by family admitted for etoh withdrawal and started on benzo taper.    #Witnessed seizures  #multiple electrolyte abnormalities w/ hx of alcohol abuse  #Possible beer potomania  #ETOH Abuse  -megaloblastic anemia, likely 2/2 alcohol abuse  -[facility] protocol in place  -Patient agitation improving. Ativan taper now 0.25 mg q12, reduced from q6  -Plan to discontinue ativan tomorrow  -Deferring EEG as patient on benzos  -CT head w/o contrast as above  -IV Thiamine, IV Mag  -Elevated bilirubin in setting of alcohol abuse, abd ultrasound no acute findings. No biliary dilatation. Hepatic steatosis  -11/29 Fluids changed to D5 1/2NS    #Right UE cellulitis with purulent drainage  -purulence appreciated by PA overnight, + erythema, warm to the touch, no fluctuation.  -Potential old IV site, patient unable to give history.    -Started on Zosyn and Vanco empirically  -11/28 vanc trough 6.8  - MRSA swab, blood cultures pending  - RUE US revealed superficial thrombophlebitis of the cephalic vein in the right antecubital fossa.  -bcx ordered    #Thrombocytopenia   -platelet count 56 on admission  -will monitor for now    #COVID +  incidental. symptomatic  monitor    Dispo: Trend labs, monitor for seizure activity/withdrawal, d/c ativan if patient continues to improve 53M w/ PMHx of polysubstance abuse - alcohol, tobacco and marijuana presents for evaluation of multiple witnessed seizures by family admitted for etoh withdrawal and started on benzo taper.    #Witnessed seizures  #multiple electrolyte abnormalities w/ hx of alcohol abuse  #Possible beer potomania  #ETOH Abuse  -megaloblastic anemia, likely 2/2 alcohol abuse  -[facility] protocol in place  -Patient agitation improving. Ativan taper completed 12/1.  -Deferring EEG as patient on benzos  -CT head w/o contrast as above  -IV Thiamine, IV Mag  -Elevated bilirubin in setting of alcohol abuse, abd ultrasound no acute findings. No biliary dilatation. Hepatic steatosis  -11/29 Fluids changed to D5 1/2NS    #Right UE cellulitis with purulent drainage  -purulence appreciated by PA overnight, + erythema, warm to the touch, no fluctuation.  -Potential old IV site, patient unable to give history.    -Started on Zosyn and Vanco empirically  -11/28 vanc trough 6.8  - MRSA swab, blood cultures pending  - RUE US revealed superficial thrombophlebitis of the cephalic vein in the right antecubital fossa.  - Will discharge on Keflex, 500 qid x 5 days    #Thrombocytopenia   -platelet count 56 on admission  -will monitor for now    #COVID +  incidental. symptomatic  monitor    Dispo: Ativan taper completed. D/c pending

## 2023-12-01 NOTE — PROGRESS NOTE ADULT - ATTENDING COMMENTS
Hx etoh abuse. Admitted with etoh w/d    CCB right Upper extrem cellulitis.  Cont abx. US without abscess.  Cont ativan. taper to 0.25mg q12  bed to chair   PT .    DC 1-2 Days
CCB right Upper extrem cellulitis.  Cont abx. US without abscess.  Cont ativan. taper to 0.25mg q6h  bed to chair   PT
Admitted with possible seizure in setting of etoh abuse and electrolyte abnormalities including hyponatremia, hypokalemia    CCB right Upper extrem cellulitis.  Cont abx. US without abscess.  Cont ativan. taper to 0.5mg q6h  add iv mag, oral K.   Vanco trough this evening  DC tele
Hx etoh abuse. Admitted with etoh w/d ccb rue cellulitis    Cont abx. US without abscess.  s/p ativan taper    DC 1-2 Days . Inpatient rehab vs home
Admitted with possible seizure in setting of etoh abuse and electrolyte abnormalities including hyponatremia, hypokalemia    Decrease ativan regiment as patient is oversedated  monitor for seizures   add kcl 40
Admitted with possible seizure in setting of etoh abuse and electrolyte abnormalities including hyponatremia, hypokalemia    CCB right Upper extrem cellulitis.  Cont abx  f/u US  Cont ativan  add iv mag

## 2023-12-02 VITALS
DIASTOLIC BLOOD PRESSURE: 87 MMHG | TEMPERATURE: 98 F | SYSTOLIC BLOOD PRESSURE: 128 MMHG | HEART RATE: 87 BPM | OXYGEN SATURATION: 97 % | RESPIRATION RATE: 17 BRPM

## 2023-12-02 LAB
ALBUMIN SERPL ELPH-MCNC: 2.8 G/DL — LOW (ref 3.3–5.2)
ALBUMIN SERPL ELPH-MCNC: 2.8 G/DL — LOW (ref 3.3–5.2)
ALP SERPL-CCNC: 67 U/L — SIGNIFICANT CHANGE UP (ref 40–120)
ALP SERPL-CCNC: 67 U/L — SIGNIFICANT CHANGE UP (ref 40–120)
ALT FLD-CCNC: 75 U/L — HIGH
ALT FLD-CCNC: 75 U/L — HIGH
ANION GAP SERPL CALC-SCNC: 10 MMOL/L — SIGNIFICANT CHANGE UP (ref 5–17)
ANION GAP SERPL CALC-SCNC: 10 MMOL/L — SIGNIFICANT CHANGE UP (ref 5–17)
AST SERPL-CCNC: 121 U/L — HIGH
AST SERPL-CCNC: 121 U/L — HIGH
BASOPHILS # BLD AUTO: 0.07 K/UL — SIGNIFICANT CHANGE UP (ref 0–0.2)
BASOPHILS # BLD AUTO: 0.07 K/UL — SIGNIFICANT CHANGE UP (ref 0–0.2)
BASOPHILS NFR BLD AUTO: 1.5 % — SIGNIFICANT CHANGE UP (ref 0–2)
BASOPHILS NFR BLD AUTO: 1.5 % — SIGNIFICANT CHANGE UP (ref 0–2)
BILIRUB DIRECT SERPL-MCNC: 0.5 MG/DL — HIGH (ref 0–0.3)
BILIRUB DIRECT SERPL-MCNC: 0.5 MG/DL — HIGH (ref 0–0.3)
BILIRUB INDIRECT FLD-MCNC: 0.7 MG/DL — SIGNIFICANT CHANGE UP (ref 0.2–1)
BILIRUB INDIRECT FLD-MCNC: 0.7 MG/DL — SIGNIFICANT CHANGE UP (ref 0.2–1)
BILIRUB SERPL-MCNC: 1.2 MG/DL — SIGNIFICANT CHANGE UP (ref 0.4–2)
BILIRUB SERPL-MCNC: 1.2 MG/DL — SIGNIFICANT CHANGE UP (ref 0.4–2)
BUN SERPL-MCNC: 4.9 MG/DL — LOW (ref 8–20)
BUN SERPL-MCNC: 4.9 MG/DL — LOW (ref 8–20)
CALCIUM SERPL-MCNC: 8.2 MG/DL — LOW (ref 8.4–10.5)
CALCIUM SERPL-MCNC: 8.2 MG/DL — LOW (ref 8.4–10.5)
CHLORIDE SERPL-SCNC: 98 MMOL/L — SIGNIFICANT CHANGE UP (ref 96–108)
CHLORIDE SERPL-SCNC: 98 MMOL/L — SIGNIFICANT CHANGE UP (ref 96–108)
CO2 SERPL-SCNC: 26 MMOL/L — SIGNIFICANT CHANGE UP (ref 22–29)
CO2 SERPL-SCNC: 26 MMOL/L — SIGNIFICANT CHANGE UP (ref 22–29)
CREAT SERPL-MCNC: 0.42 MG/DL — LOW (ref 0.5–1.3)
CREAT SERPL-MCNC: 0.42 MG/DL — LOW (ref 0.5–1.3)
CULTURE RESULTS: ABNORMAL
CULTURE RESULTS: ABNORMAL
CULTURE RESULTS: SIGNIFICANT CHANGE UP
EGFR: 129 ML/MIN/1.73M2 — SIGNIFICANT CHANGE UP
EGFR: 129 ML/MIN/1.73M2 — SIGNIFICANT CHANGE UP
EOSINOPHIL # BLD AUTO: 0.09 K/UL — SIGNIFICANT CHANGE UP (ref 0–0.5)
EOSINOPHIL # BLD AUTO: 0.09 K/UL — SIGNIFICANT CHANGE UP (ref 0–0.5)
EOSINOPHIL NFR BLD AUTO: 1.9 % — SIGNIFICANT CHANGE UP (ref 0–6)
EOSINOPHIL NFR BLD AUTO: 1.9 % — SIGNIFICANT CHANGE UP (ref 0–6)
GLUCOSE SERPL-MCNC: 81 MG/DL — SIGNIFICANT CHANGE UP (ref 70–99)
GLUCOSE SERPL-MCNC: 81 MG/DL — SIGNIFICANT CHANGE UP (ref 70–99)
HCT VFR BLD CALC: 30.6 % — LOW (ref 39–50)
HCT VFR BLD CALC: 30.6 % — LOW (ref 39–50)
HGB BLD-MCNC: 10.6 G/DL — LOW (ref 13–17)
HGB BLD-MCNC: 10.6 G/DL — LOW (ref 13–17)
IMM GRANULOCYTES NFR BLD AUTO: 3.4 % — HIGH (ref 0–0.9)
IMM GRANULOCYTES NFR BLD AUTO: 3.4 % — HIGH (ref 0–0.9)
LYMPHOCYTES # BLD AUTO: 0.82 K/UL — LOW (ref 1–3.3)
LYMPHOCYTES # BLD AUTO: 0.82 K/UL — LOW (ref 1–3.3)
LYMPHOCYTES # BLD AUTO: 17.4 % — SIGNIFICANT CHANGE UP (ref 13–44)
LYMPHOCYTES # BLD AUTO: 17.4 % — SIGNIFICANT CHANGE UP (ref 13–44)
MAGNESIUM SERPL-MCNC: 1.7 MG/DL — LOW (ref 1.8–2.6)
MAGNESIUM SERPL-MCNC: 1.7 MG/DL — LOW (ref 1.8–2.6)
MCHC RBC-ENTMCNC: 34.6 GM/DL — SIGNIFICANT CHANGE UP (ref 32–36)
MCHC RBC-ENTMCNC: 34.6 GM/DL — SIGNIFICANT CHANGE UP (ref 32–36)
MCHC RBC-ENTMCNC: 37.7 PG — HIGH (ref 27–34)
MCHC RBC-ENTMCNC: 37.7 PG — HIGH (ref 27–34)
MCV RBC AUTO: 108.9 FL — HIGH (ref 80–100)
MCV RBC AUTO: 108.9 FL — HIGH (ref 80–100)
MONOCYTES # BLD AUTO: 0.72 K/UL — SIGNIFICANT CHANGE UP (ref 0–0.9)
MONOCYTES # BLD AUTO: 0.72 K/UL — SIGNIFICANT CHANGE UP (ref 0–0.9)
MONOCYTES NFR BLD AUTO: 15.3 % — HIGH (ref 2–14)
MONOCYTES NFR BLD AUTO: 15.3 % — HIGH (ref 2–14)
NEUTROPHILS # BLD AUTO: 2.86 K/UL — SIGNIFICANT CHANGE UP (ref 1.8–7.4)
NEUTROPHILS # BLD AUTO: 2.86 K/UL — SIGNIFICANT CHANGE UP (ref 1.8–7.4)
NEUTROPHILS NFR BLD AUTO: 60.5 % — SIGNIFICANT CHANGE UP (ref 43–77)
NEUTROPHILS NFR BLD AUTO: 60.5 % — SIGNIFICANT CHANGE UP (ref 43–77)
ORGANISM # SPEC MICROSCOPIC CNT: ABNORMAL
ORGANISM # SPEC MICROSCOPIC CNT: ABNORMAL
ORGANISM # SPEC MICROSCOPIC CNT: SIGNIFICANT CHANGE UP
ORGANISM # SPEC MICROSCOPIC CNT: SIGNIFICANT CHANGE UP
PLATELET # BLD AUTO: 207 K/UL — SIGNIFICANT CHANGE UP (ref 150–400)
PLATELET # BLD AUTO: 207 K/UL — SIGNIFICANT CHANGE UP (ref 150–400)
POTASSIUM SERPL-MCNC: 3.6 MMOL/L — SIGNIFICANT CHANGE UP (ref 3.5–5.3)
POTASSIUM SERPL-MCNC: 3.6 MMOL/L — SIGNIFICANT CHANGE UP (ref 3.5–5.3)
POTASSIUM SERPL-SCNC: 3.6 MMOL/L — SIGNIFICANT CHANGE UP (ref 3.5–5.3)
POTASSIUM SERPL-SCNC: 3.6 MMOL/L — SIGNIFICANT CHANGE UP (ref 3.5–5.3)
PROT SERPL-MCNC: 5 G/DL — LOW (ref 6.6–8.7)
PROT SERPL-MCNC: 5 G/DL — LOW (ref 6.6–8.7)
RBC # BLD: 2.81 M/UL — LOW (ref 4.2–5.8)
RBC # BLD: 2.81 M/UL — LOW (ref 4.2–5.8)
RBC # FLD: 13.2 % — SIGNIFICANT CHANGE UP (ref 10.3–14.5)
RBC # FLD: 13.2 % — SIGNIFICANT CHANGE UP (ref 10.3–14.5)
SODIUM SERPL-SCNC: 134 MMOL/L — LOW (ref 135–145)
SODIUM SERPL-SCNC: 134 MMOL/L — LOW (ref 135–145)
SPECIMEN SOURCE: SIGNIFICANT CHANGE UP
WBC # BLD: 4.72 K/UL — SIGNIFICANT CHANGE UP (ref 3.8–10.5)
WBC # BLD: 4.72 K/UL — SIGNIFICANT CHANGE UP (ref 3.8–10.5)
WBC # FLD AUTO: 4.72 K/UL — SIGNIFICANT CHANGE UP (ref 3.8–10.5)
WBC # FLD AUTO: 4.72 K/UL — SIGNIFICANT CHANGE UP (ref 3.8–10.5)

## 2023-12-02 PROCEDURE — 82553 CREATINE MB FRACTION: CPT

## 2023-12-02 PROCEDURE — 84300 ASSAY OF URINE SODIUM: CPT

## 2023-12-02 PROCEDURE — 80053 COMPREHEN METABOLIC PANEL: CPT

## 2023-12-02 PROCEDURE — 96374 THER/PROPH/DIAG INJ IV PUSH: CPT

## 2023-12-02 PROCEDURE — 80048 BASIC METABOLIC PNL TOTAL CA: CPT

## 2023-12-02 PROCEDURE — 80307 DRUG TEST PRSMV CHEM ANLYZR: CPT

## 2023-12-02 PROCEDURE — 82550 ASSAY OF CK (CPK): CPT

## 2023-12-02 PROCEDURE — 99285 EMERGENCY DEPT VISIT HI MDM: CPT | Mod: 25

## 2023-12-02 PROCEDURE — 83735 ASSAY OF MAGNESIUM: CPT

## 2023-12-02 PROCEDURE — 84100 ASSAY OF PHOSPHORUS: CPT

## 2023-12-02 PROCEDURE — 96375 TX/PRO/DX INJ NEW DRUG ADDON: CPT

## 2023-12-02 PROCEDURE — 87077 CULTURE AEROBIC IDENTIFY: CPT

## 2023-12-02 PROCEDURE — 87641 MR-STAPH DNA AMP PROBE: CPT

## 2023-12-02 PROCEDURE — 83935 ASSAY OF URINE OSMOLALITY: CPT

## 2023-12-02 PROCEDURE — 82248 BILIRUBIN DIRECT: CPT

## 2023-12-02 PROCEDURE — 81001 URINALYSIS AUTO W/SCOPE: CPT

## 2023-12-02 PROCEDURE — 80076 HEPATIC FUNCTION PANEL: CPT

## 2023-12-02 PROCEDURE — 87640 STAPH A DNA AMP PROBE: CPT

## 2023-12-02 PROCEDURE — 76705 ECHO EXAM OF ABDOMEN: CPT

## 2023-12-02 PROCEDURE — 92610 EVALUATE SWALLOWING FUNCTION: CPT

## 2023-12-02 PROCEDURE — 83880 ASSAY OF NATRIURETIC PEPTIDE: CPT

## 2023-12-02 PROCEDURE — 82570 ASSAY OF URINE CREATININE: CPT

## 2023-12-02 PROCEDURE — 87186 SC STD MICRODIL/AGAR DIL: CPT

## 2023-12-02 PROCEDURE — 97530 THERAPEUTIC ACTIVITIES: CPT

## 2023-12-02 PROCEDURE — 80202 ASSAY OF VANCOMYCIN: CPT

## 2023-12-02 PROCEDURE — 97116 GAIT TRAINING THERAPY: CPT

## 2023-12-02 PROCEDURE — 83605 ASSAY OF LACTIC ACID: CPT

## 2023-12-02 PROCEDURE — 84133 ASSAY OF URINE POTASSIUM: CPT

## 2023-12-02 PROCEDURE — 84484 ASSAY OF TROPONIN QUANT: CPT

## 2023-12-02 PROCEDURE — 84156 ASSAY OF PROTEIN URINE: CPT

## 2023-12-02 PROCEDURE — 96376 TX/PRO/DX INJ SAME DRUG ADON: CPT

## 2023-12-02 PROCEDURE — 82010 KETONE BODYS QUAN: CPT

## 2023-12-02 PROCEDURE — 87070 CULTURE OTHR SPECIMN AEROBIC: CPT

## 2023-12-02 PROCEDURE — 82962 GLUCOSE BLOOD TEST: CPT

## 2023-12-02 PROCEDURE — 84146 ASSAY OF PROLACTIN: CPT

## 2023-12-02 PROCEDURE — 87040 BLOOD CULTURE FOR BACTERIA: CPT

## 2023-12-02 PROCEDURE — 85610 PROTHROMBIN TIME: CPT

## 2023-12-02 PROCEDURE — 87635 SARS-COV-2 COVID-19 AMP PRB: CPT

## 2023-12-02 PROCEDURE — 85025 COMPLETE CBC W/AUTO DIFF WBC: CPT

## 2023-12-02 PROCEDURE — 84540 ASSAY OF URINE/UREA-N: CPT

## 2023-12-02 PROCEDURE — 76882 US LMTD JT/FCL EVL NVASC XTR: CPT

## 2023-12-02 PROCEDURE — 92526 ORAL FUNCTION THERAPY: CPT

## 2023-12-02 PROCEDURE — 71045 X-RAY EXAM CHEST 1 VIEW: CPT

## 2023-12-02 PROCEDURE — 83930 ASSAY OF BLOOD OSMOLALITY: CPT

## 2023-12-02 PROCEDURE — 93005 ELECTROCARDIOGRAM TRACING: CPT

## 2023-12-02 PROCEDURE — 85730 THROMBOPLASTIN TIME PARTIAL: CPT

## 2023-12-02 PROCEDURE — G1004: CPT

## 2023-12-02 PROCEDURE — 70450 CT HEAD/BRAIN W/O DYE: CPT | Mod: ME

## 2023-12-02 PROCEDURE — 36415 COLL VENOUS BLD VENIPUNCTURE: CPT

## 2023-12-02 PROCEDURE — 85027 COMPLETE CBC AUTOMATED: CPT

## 2023-12-02 PROCEDURE — 97163 PT EVAL HIGH COMPLEX 45 MIN: CPT

## 2023-12-02 PROCEDURE — 87205 SMEAR GRAM STAIN: CPT

## 2023-12-02 RX ADMIN — Medication 500 MILLIGRAM(S): at 00:37

## 2023-12-02 RX ADMIN — Medication 500 MILLIGRAM(S): at 07:45
